# Patient Record
Sex: MALE | Race: WHITE | NOT HISPANIC OR LATINO | Employment: OTHER | URBAN - METROPOLITAN AREA
[De-identification: names, ages, dates, MRNs, and addresses within clinical notes are randomized per-mention and may not be internally consistent; named-entity substitution may affect disease eponyms.]

---

## 2022-05-05 ENCOUNTER — OFFICE VISIT (OUTPATIENT)
Dept: GASTROENTEROLOGY | Facility: CLINIC | Age: 67
End: 2022-05-05
Payer: MEDICARE

## 2022-05-05 VITALS
SYSTOLIC BLOOD PRESSURE: 136 MMHG | DIASTOLIC BLOOD PRESSURE: 86 MMHG | HEIGHT: 68 IN | BODY MASS INDEX: 45.01 KG/M2 | HEART RATE: 88 BPM | WEIGHT: 297 LBS

## 2022-05-05 DIAGNOSIS — K58.0 IRRITABLE BOWEL SYNDROME WITH DIARRHEA: ICD-10-CM

## 2022-05-05 DIAGNOSIS — K21.9 GASTROESOPHAGEAL REFLUX DISEASE WITHOUT ESOPHAGITIS: Primary | ICD-10-CM

## 2022-05-05 PROBLEM — R10.9 ABDOMINAL PAIN: Status: ACTIVE | Noted: 2022-05-05

## 2022-05-05 PROCEDURE — 99204 OFFICE O/P NEW MOD 45 MIN: CPT | Performed by: INTERNAL MEDICINE

## 2022-05-05 RX ORDER — ALPRAZOLAM 1 MG/1
1 TABLET ORAL DAILY PRN
COMMUNITY
Start: 2022-04-28

## 2022-05-05 RX ORDER — LORATADINE 10 MG/1
10 TABLET ORAL DAILY
COMMUNITY
Start: 2022-04-28

## 2022-05-05 RX ORDER — PANTOPRAZOLE SODIUM 40 MG/1
40 TABLET, DELAYED RELEASE ORAL 2 TIMES DAILY
COMMUNITY
Start: 2022-01-03 | End: 2022-05-05 | Stop reason: SDUPTHER

## 2022-05-05 RX ORDER — GABAPENTIN 300 MG/1
300 CAPSULE ORAL 3 TIMES DAILY
COMMUNITY
Start: 2022-02-17

## 2022-05-05 RX ORDER — ONDANSETRON 8 MG/1
8 TABLET, ORALLY DISINTEGRATING ORAL
COMMUNITY
Start: 2022-04-28

## 2022-05-05 RX ORDER — ATENOLOL 100 MG/1
150 TABLET ORAL 2 TIMES DAILY
COMMUNITY
Start: 2022-04-01

## 2022-05-05 RX ORDER — ALBUTEROL SULFATE 90 UG/1
2 AEROSOL, METERED RESPIRATORY (INHALATION)
COMMUNITY
Start: 2022-03-24

## 2022-05-05 RX ORDER — FUROSEMIDE 20 MG/1
20 TABLET ORAL 2 TIMES DAILY
COMMUNITY

## 2022-05-05 RX ORDER — METOPROLOL SUCCINATE 50 MG/1
50 TABLET, EXTENDED RELEASE ORAL DAILY
COMMUNITY
Start: 2021-12-29

## 2022-05-05 RX ORDER — DOXYCYCLINE 100 MG/1
TABLET ORAL
COMMUNITY

## 2022-05-05 RX ORDER — LANSOPRAZOLE 30 MG/1
30 CAPSULE, DELAYED RELEASE ORAL DAILY
COMMUNITY
End: 2022-05-05

## 2022-05-05 RX ORDER — PHENOL 1.4 %
10 AEROSOL, SPRAY (ML) MUCOUS MEMBRANE DAILY
COMMUNITY

## 2022-05-05 RX ORDER — FAMOTIDINE 20 MG/1
TABLET, FILM COATED ORAL
COMMUNITY
Start: 2022-03-28 | End: 2022-05-05

## 2022-05-05 RX ORDER — METOPROLOL SUCCINATE 50 MG/1
50 TABLET, EXTENDED RELEASE ORAL DAILY
COMMUNITY
Start: 2022-03-28

## 2022-05-05 RX ORDER — PANTOPRAZOLE SODIUM 40 MG/1
40 TABLET, DELAYED RELEASE ORAL 2 TIMES DAILY
Qty: 60 TABLET | Refills: 11 | Status: SHIPPED | OUTPATIENT
Start: 2022-05-05

## 2022-05-05 RX ORDER — FAMOTIDINE 40 MG/1
40 TABLET, FILM COATED ORAL DAILY
COMMUNITY
Start: 2022-02-24 | End: 2022-05-05

## 2022-05-05 RX ORDER — LIDOCAINE HYDROCHLORIDE 20 MG/ML
SOLUTION OROPHARYNGEAL
COMMUNITY
Start: 2022-04-28

## 2022-05-05 RX ORDER — DICYCLOMINE HYDROCHLORIDE 10 MG/1
CAPSULE ORAL
COMMUNITY
Start: 2022-04-28

## 2022-05-05 RX ORDER — METOCLOPRAMIDE 10 MG/1
10 TABLET ORAL 2 TIMES DAILY
Qty: 60 TABLET | Refills: 3 | Status: SHIPPED | OUTPATIENT
Start: 2022-05-05 | End: 2022-07-07 | Stop reason: SDUPTHER

## 2022-05-05 RX ORDER — ATORVASTATIN CALCIUM 10 MG/1
10 TABLET, FILM COATED ORAL DAILY
COMMUNITY
Start: 2022-03-17

## 2022-05-05 RX ORDER — ONDANSETRON HYDROCHLORIDE 8 MG/1
TABLET, FILM COATED ORAL
COMMUNITY

## 2022-05-05 RX ORDER — SUCRALFATE 1 G/1
1 TABLET ORAL 4 TIMES DAILY
COMMUNITY
Start: 2021-12-29

## 2022-05-05 NOTE — PROGRESS NOTES
Consultation - 126 [facility]  Ru Shelby 1955 75 y o  male     ASSESSMENT @ PLAN:   He is a 69-year-old male with gastroesophageal reflux disease related to his morbid obesity with BMI of 41 6  He had endoscopy at [facility] in December of 2021 which showed irregular Z-line and a small hiatal hernia  He has failed pantoprazole daily in addition to famotidine and Carafate  1 we will go up on his Protonix to 40 mg p o  b i d   If this is not a get approved he has to have a prior authorization done he has failed multiple medications in addition to addition of Pepcid and Carafate    2 will give Reglan 10 mg p o  b i d     3 I counseled him to not eat close to bedtime to lose weight and avoid food triggers    4 he will need follow-up  In addition he needs to have discussion regarding his IBS D at follow-up    Chief Complaint:  GERD and IBS D    HPI:  He is a 69-year-old male with gastroesophageal reflux disease which is not responding to standard medications  He has had 2 years of severe reflux  He does have morbid obesity with central adiposity with BMI of 45 1  He just had an endoscopy and I did review this in the system in Care everywhere  It was done at [facility] in December 2021  He had a small hiatal hernia with irregular Z-line  Biopsies were negative for Cottrell's esophagus  The patient has a lap band which is deflated  He reports heartburn  He has  heartburn regurgitation mostly at night  He states sleeps upright in recliner  He has no solid or liquid food dysphagia no globus  He does have abdominal pains at times  He does have known IBS  He has diarrhea and constipation with regular stools  He has no melena hematochezia  He reports a colonoscopy in 2018 and another institution  He reports gas and bloating and flatulence  The patient has been on pantoprazole 40 mg daily for years  He was then added Pepcid at bedtime  He was just added Carafate q i d    He has never been on a prokinetic never had a gastric emptying study  He has never had his Protonix at b i d  He reports that he does not eat close to bedtime  REVIEW OF SYSTEMS:     CONSTITUTIONAL: Denies any fever, chills, or rigors  Good appetite, and no recent weight loss  HEENT: No earache or tinnitus  Denies hearing loss or visual disturbances  CARDIOVASCULAR: No chest pain or palpitations  RESPIRATORY: Denies any cough, hemoptysis, shortness of breath or dyspnea on exertion  GASTROINTESTINAL: As noted in the History of Present Illness  GENITOURINARY: No problems with urination  Denies any hematuria or dysuria  NEUROLOGIC: No dizziness or vertigo, denies headaches  MUSCULOSKELETAL: Denies any muscle or joint pain  SKIN: Denies skin rashes or itching  ENDOCRINE: Denies excessive thirst  Denies intolerance to heat or cold  PSYCHOSOCIAL: Denies depression or anxiety  Denies any recent memory loss       Past Medical History:   Diagnosis Date    Atrial fibrillation (HCC)     GERD (gastroesophageal reflux disease)     Hyperlipidemia     Hypertension     Irritable bowel syndrome     Obesity     Vocal cord dysfunction       Past Surgical History:   Procedure Laterality Date    ANKLE ARTHROPLASTY      CARDIAC ELECTROPHYSIOLOGY MAPPING AND ABLATION       Social History     Socioeconomic History    Marital status: /Civil Union     Spouse name: Not on file    Number of children: Not on file    Years of education: Not on file    Highest education level: Not on file   Occupational History    Not on file   Tobacco Use    Smoking status: Former Smoker     Quit date:      Years since quittin 3    Smokeless tobacco: Never Used   Substance and Sexual Activity    Alcohol use: Yes     Comment: occasionally    Drug use: Yes     Types: Marijuana    Sexual activity: Not on file   Other Topics Concern    Not on file   Social History Narrative    Not on file     Social Determinants of Health     Financial Resource Strain: Not on file   Food Insecurity: Not on file   Transportation Needs: Not on file   Physical Activity: Not on file   Stress: Not on file   Social Connections: Not on file   Intimate Partner Violence: Not on file   Housing Stability: Not on file     Family History   Problem Relation Age of Onset    Cancer Mother     Cancer Sister      Cat hair extract, Dust mite extract, and Pollen extract  Current Outpatient Medications   Medication Sig Dispense Refill    albuterol (PROVENTIL HFA,VENTOLIN HFA) 90 mcg/act inhaler Inhale 2 puffs      ALPRAZolam (XANAX) 1 mg tablet Take 1 mg by mouth daily as needed      Apoaequorin 10 MG CAPS Take by mouth      atorvastatin (LIPITOR) 10 mg tablet Take 10 mg by mouth daily      dicyclomine (BENTYL) 10 mg capsule TAKE 1 CAPSULE BY MOUTH 4 TIMES DAILY WITH MEALS AND AT BEDTIME      doxycycline (ADOXA) 100 MG tablet Take by mouth      furosemide (LASIX) 20 mg tablet Take 20 mg by mouth 2 (two) times a day      gabapentin (NEURONTIN) 300 mg capsule Take 300 mg by mouth 3 (three) times a day      Lidocaine Viscous HCl (XYLOCAINE) 2 % mucosal solution       loratadine (CLARITIN) 10 mg tablet Take 10 mg by mouth daily      Melatonin 10 MG TABS Take 10 mg by mouth daily      metoprolol succinate (TOPROL-XL) 50 mg 24 hr tablet Take 50 mg by mouth daily      metoprolol succinate (TOPROL-XL) 50 mg 24 hr tablet Take 50 mg by mouth daily      ondansetron (ZOFRAN) 8 mg tablet Take by mouth      ondansetron (ZOFRAN-ODT) 8 mg disintegrating tablet Take 8 mg by mouth      pantoprazole (PROTONIX) 40 mg tablet Take 1 tablet (40 mg total) by mouth 2 (two) times a day 60 tablet 11    Pradaxa 150 MG capsu Take 150 mg by mouth 2 (two) times a day      sertraline (ZOLOFT) 50 mg tablet Take 50 mg by mouth daily      Spacer/Aero-Holding [place] Use with inhaler        sucralfate (CARAFATE) 1 g tablet Take 1 g by mouth 4 (four) times a day      metoclopramide (Reglan) 10 mg tablet Take 1 tablet (10 mg total) by mouth 2 (two) times a day 60 tablet 3     No current facility-administered medications for this visit  Blood pressure 136/86, pulse 88, height 5' 8" (1 727 m), weight 135 kg (297 lb)  PHYSICAL EXAM:     General Appearance:   Alert, cooperative, no distress, appears stated age    HEENT:   Normocephalic, atraumatic, anicteric      Neck:  Supple, symmetrical, trachea midline, no adenopathy;    thyroid: no enlargement/tenderness/nodules; no carotid  bruit or JVD    Lungs:   Clear to auscultation bilaterally; no rales, rhonchi or wheezing; respirations unlabored    Heart[de-identified]   S1 and S2 normal; regular rate and rhythm; no murmur, rub, or gallop     Abdomen:   Soft, non-tender, non-distended; normal bowel sounds; no masses, no organomegaly    Genitalia:   Deferred    Rectal:   Deferred    Extremities:  No cyanosis, clubbing or edema    Pulses:  2+ and symmetric all extremities    Skin:  Skin color, texture, turgor normal, no rashes or lesions    Lymph nodes:  No palpable cervical, axillary or inguinal lymphadenopathy        No results found for: WBC, HGB, HCT, MCV, PLT  No results found for: GLUCOSE, CALCIUM, NA, K, CO2, CL, BUN, CREATININE  No results found for: ALT, AST, GGT, ALKPHOS, BILITOT  No results found for: INR, PROTIME

## 2022-07-07 ENCOUNTER — OFFICE VISIT (OUTPATIENT)
Dept: GASTROENTEROLOGY | Facility: CLINIC | Age: 67
End: 2022-07-07
Payer: MEDICARE

## 2022-07-07 VITALS
OXYGEN SATURATION: 98 % | HEIGHT: 68 IN | DIASTOLIC BLOOD PRESSURE: 78 MMHG | BODY MASS INDEX: 44.28 KG/M2 | HEART RATE: 55 BPM | WEIGHT: 292.2 LBS | SYSTOLIC BLOOD PRESSURE: 120 MMHG

## 2022-07-07 DIAGNOSIS — K58.2 IRRITABLE BOWEL SYNDROME WITH BOTH CONSTIPATION AND DIARRHEA: ICD-10-CM

## 2022-07-07 DIAGNOSIS — K21.00 GASTROESOPHAGEAL REFLUX DISEASE WITH ESOPHAGITIS WITHOUT HEMORRHAGE: Primary | ICD-10-CM

## 2022-07-07 PROCEDURE — 99214 OFFICE O/P EST MOD 30 MIN: CPT | Performed by: PHYSICIAN ASSISTANT

## 2022-07-07 RX ORDER — FAMOTIDINE 40 MG/1
40 TABLET, FILM COATED ORAL DAILY
COMMUNITY
Start: 2022-05-13

## 2022-07-07 RX ORDER — METOCLOPRAMIDE 10 MG/1
10 TABLET ORAL 2 TIMES DAILY
Qty: 60 TABLET | Refills: 3 | Status: SHIPPED | OUTPATIENT
Start: 2022-07-07

## 2022-07-07 NOTE — PROGRESS NOTES
[facility] - Outpatient Follow-up Note  Catarino Lao 75 y o  male MRN: [id number]  Encounter: [id number]          ASSESSMENT AND PLAN:      1  Gastroesophageal reflux disease with esophagitis without hemorrhage  - He has persistent heartburn and regurgitation with dry heaving at times which has improved mildly with increasing protonix to 40 mg BID but is still very bothersome  - Schedule upper GI to evaluate overall motility and identify where lap band is  - EGD at [facility] in 2021 showed a small hiatal hernia and an irregular zline, path neg for Cottrell's  - He has failed carafate and famotidine in the past  - Will resend Reglan 10 mg BID to see if this helps reduce his symptoms  - Anti-reflux diet, weight loss      2  Irritable bowel syndrome with both constipation and diarrhea  - Continue bentyl PRN  - Start a sugar free fiber powder supplementation daily     ______________________________________________________________________    SUBJECTIVE:  Jo Whitmore is a 76 yo M presenting for follow up of continued heartburn and heat in his chest as well as flare ups of his IBS  At his last visit, his protonix was increased to BID dosing  He does report some reduction in the severity of the "heat" he feels but it is still present and he needs to sleep upright in a recliner  He is very frustrated by his symptoms  He has a lap band that is deflated but he is concerned this could be causing problems  He doesn't recall getting reglan which was prescribed at his last visit  In terms of his bowel habits, he alternates between diarrhea and constipation and has the lower abdominal cramping  Bentyl helps at times  He is not taking any medications to regulate his bowel habits currently  He has tried miralax in the past but can't recall if this helped  REVIEW OF SYSTEMS IS OTHERWISE NEGATIVE        Historical Information   Past Medical History:   Diagnosis Date    Atrial fibrillation (HCC)     GERD (gastroesophageal reflux disease)     Hyperlipidemia     Hypertension     Irritable bowel syndrome     Obesity     Vocal cord dysfunction      Past Surgical History:   Procedure Laterality Date    ANKLE ARTHROPLASTY      CARDIAC ELECTROPHYSIOLOGY MAPPING AND ABLATION       Social History   Social History     Substance and Sexual Activity   Alcohol Use Yes    Comment: occasionally     Social History     Substance and Sexual Activity   Drug Use Yes    Types: Marijuana     Social History     Tobacco Use   Smoking Status Former Smoker    Quit date:     Years since quittin 5   Smokeless Tobacco Never Used     Family History   Problem Relation Age of Onset    Cancer Mother     Cancer Sister        Meds/Allergies       Current Outpatient Medications:     albuterol (PROVENTIL HFA,VENTOLIN HFA) 90 mcg/act inhaler    ALPRAZolam (XANAX) 1 mg tablet    Apoaequorin 10 MG CAPS    atorvastatin (LIPITOR) 10 mg tablet    dicyclomine (BENTYL) 10 mg capsule    doxycycline (ADOXA) 100 MG tablet    famotidine (PEPCID) 40 MG tablet    furosemide (LASIX) 20 mg tablet    gabapentin (NEURONTIN) 300 mg capsule    Lidocaine Viscous HCl (XYLOCAINE) 2 % mucosal solution    loratadine (CLARITIN) 10 mg tablet    Melatonin 10 MG TABS    metoclopramide (Reglan) 10 mg tablet    metoprolol succinate (TOPROL-XL) 50 mg 24 hr tablet    metoprolol succinate (TOPROL-XL) 50 mg 24 hr tablet    ondansetron (ZOFRAN) 8 mg tablet    ondansetron (ZOFRAN-ODT) 8 mg disintegrating tablet    pantoprazole (PROTONIX) 40 mg tablet    Pradaxa 150 MG capsu    sertraline (ZOLOFT) 50 mg tablet    Spacer/Aero-Holding Chambers [place]UN[place]    sucralfate (CARAFATE) 1 g tablet    Allergies   Allergen Reactions    Cat Hair Extract Sneezing    Dust Mite Extract Sneezing    Pollen Extract Sneezing     Seasonal allergies           Objective     Blood pressure 120/78, pulse 55, height 5' 8" (1 727 m), weight 133 kg (292 lb 3 2 oz), SpO2 98 %  Body mass index is 44 43 kg/m²  PHYSICAL EXAM:      General Appearance:   Alert, cooperative, no distress   HEENT:   Normocephalic, atraumatic, anicteric      Neck:  Supple, symmetrical, trachea midline   Lungs:   Clear to auscultation bilaterally; no rales, rhonchi or wheezing; respirations unlabored    Heart[de-identified]   Regular rate and rhythm; no murmur, rub, or gallop  Abdomen:   Soft, non-tender, non-distended; normal bowel sounds; no masses, no organomegaly    Genitalia:   Deferred    Rectal:   Deferred    Extremities:  No cyanosis, clubbing or edema    Pulses:  2+ and symmetric    Skin:  No jaundice, rashes, or lesions    Lymph nodes:  No palpable cervical lymphadenopathy        Lab Results:   No visits with results within 1 Day(s) from this visit  Latest known visit with results is:   No results found for any previous visit  Radiology Results:   No results found

## 2022-08-19 ENCOUNTER — HOSPITAL ENCOUNTER (OUTPATIENT)
Dept: RADIOLOGY | Facility: HOSPITAL | Age: 67
Discharge: HOME | End: 2022-08-19
Payer: MEDICARE

## 2022-08-19 DIAGNOSIS — K21.00 GASTROESOPHAGEAL REFLUX DISEASE WITH ESOPHAGITIS WITHOUT HEMORRHAGE: ICD-10-CM

## 2022-08-19 PROCEDURE — 74240 X-RAY XM UPR GI TRC 1CNTRST: CPT

## 2022-11-23 DIAGNOSIS — K21.00 GASTROESOPHAGEAL REFLUX DISEASE WITH ESOPHAGITIS WITHOUT HEMORRHAGE: ICD-10-CM

## 2022-11-23 RX ORDER — METOCLOPRAMIDE 10 MG/1
TABLET ORAL
Qty: 60 TABLET | Refills: 0 | Status: SHIPPED | OUTPATIENT
Start: 2022-11-23

## 2022-12-23 DIAGNOSIS — K21.00 GASTROESOPHAGEAL REFLUX DISEASE WITH ESOPHAGITIS WITHOUT HEMORRHAGE: ICD-10-CM

## 2022-12-23 RX ORDER — METOCLOPRAMIDE 10 MG/1
10 TABLET ORAL 2 TIMES DAILY
Qty: 60 TABLET | Refills: 0 | Status: SHIPPED | OUTPATIENT
Start: 2022-12-23

## 2023-01-26 DIAGNOSIS — K21.00 GASTROESOPHAGEAL REFLUX DISEASE WITH ESOPHAGITIS WITHOUT HEMORRHAGE: ICD-10-CM

## 2023-01-26 RX ORDER — METOCLOPRAMIDE 10 MG/1
10 TABLET ORAL 2 TIMES DAILY
Qty: 180 TABLET | Refills: 2 | Status: SHIPPED | OUTPATIENT
Start: 2023-01-26 | End: 2023-04-26

## 2023-01-26 NOTE — TELEPHONE ENCOUNTER
Edmundo pt-  RX: Metoclopramide 10 mg 90 qty/ refills  Uses: [facility] [id number] 16-Jun-2021 15:59 16-Jun-2021 16:05

## 2024-02-08 ENCOUNTER — OFFICE VISIT (OUTPATIENT)
Dept: FAMILY MEDICINE CLINIC | Facility: CLINIC | Age: 69
End: 2024-02-08
Payer: MEDICARE

## 2024-02-08 ENCOUNTER — APPOINTMENT (OUTPATIENT)
Dept: RADIOLOGY | Facility: CLINIC | Age: 69
End: 2024-02-08
Payer: MEDICARE

## 2024-02-08 ENCOUNTER — APPOINTMENT (OUTPATIENT)
Dept: LAB | Facility: CLINIC | Age: 69
End: 2024-02-08
Payer: MEDICARE

## 2024-02-08 VITALS
DIASTOLIC BLOOD PRESSURE: 86 MMHG | HEART RATE: 61 BPM | HEIGHT: 68 IN | OXYGEN SATURATION: 98 % | SYSTOLIC BLOOD PRESSURE: 128 MMHG | WEIGHT: 270.4 LBS | TEMPERATURE: 98.4 F | BODY MASS INDEX: 40.98 KG/M2

## 2024-02-08 DIAGNOSIS — Z12.5 SCREENING PSA (PROSTATE SPECIFIC ANTIGEN): ICD-10-CM

## 2024-02-08 DIAGNOSIS — K22.70 BARRETT'S ESOPHAGUS WITHOUT DYSPLASIA: ICD-10-CM

## 2024-02-08 DIAGNOSIS — R26.89 BALANCE DISORDER: ICD-10-CM

## 2024-02-08 DIAGNOSIS — R05.1 ACUTE COUGH: ICD-10-CM

## 2024-02-08 DIAGNOSIS — I48.0 PAF (PAROXYSMAL ATRIAL FIBRILLATION) (HCC): Primary | ICD-10-CM

## 2024-02-08 DIAGNOSIS — I42.0 DILATED IDIOPATHIC CARDIOMYOPATHY (HCC): ICD-10-CM

## 2024-02-08 DIAGNOSIS — E78.2 MIXED HYPERLIPIDEMIA: ICD-10-CM

## 2024-02-08 DIAGNOSIS — Z00.00 MEDICARE ANNUAL WELLNESS VISIT, INITIAL: ICD-10-CM

## 2024-02-08 DIAGNOSIS — K21.9 GASTROESOPHAGEAL REFLUX DISEASE WITHOUT ESOPHAGITIS: ICD-10-CM

## 2024-02-08 DIAGNOSIS — Z11.59 NEED FOR HEPATITIS C SCREENING TEST: ICD-10-CM

## 2024-02-08 DIAGNOSIS — F51.04 PSYCHOPHYSIOLOGICAL INSOMNIA: ICD-10-CM

## 2024-02-08 DIAGNOSIS — Z76.89 ENCOUNTER TO ESTABLISH CARE: ICD-10-CM

## 2024-02-08 DIAGNOSIS — R06.2 WHEEZING: ICD-10-CM

## 2024-02-08 DIAGNOSIS — G62.9 PERIPHERAL POLYNEUROPATHY: ICD-10-CM

## 2024-02-08 DIAGNOSIS — F12.90 MARIJUANA USE: ICD-10-CM

## 2024-02-08 DIAGNOSIS — I48.0 PAF (PAROXYSMAL ATRIAL FIBRILLATION) (HCC): ICD-10-CM

## 2024-02-08 DIAGNOSIS — R73.9 HYPERGLYCEMIA: ICD-10-CM

## 2024-02-08 PROBLEM — Z98.890 S/P ABLATION OF ATRIAL FIBRILLATION: Status: ACTIVE | Noted: 2019-08-05

## 2024-02-08 PROBLEM — F32.A MILD DEPRESSION: Status: ACTIVE | Noted: 2022-04-28

## 2024-02-08 PROBLEM — E78.5 HYPERLIPIDEMIA: Status: ACTIVE | Noted: 2017-05-30

## 2024-02-08 PROBLEM — R10.9 ABDOMINAL PAIN: Status: RESOLVED | Noted: 2022-05-05 | Resolved: 2024-02-08

## 2024-02-08 PROBLEM — Z87.19 HISTORY OF DIVERTICULITIS: Status: ACTIVE | Noted: 2019-10-02

## 2024-02-08 PROBLEM — Z86.79 S/P ABLATION OF ATRIAL FIBRILLATION: Status: ACTIVE | Noted: 2019-08-05

## 2024-02-08 LAB
BASOPHILS # BLD AUTO: 0.08 THOUSANDS/ÂΜL (ref 0–0.1)
BASOPHILS NFR BLD AUTO: 1 % (ref 0–1)
EOSINOPHIL # BLD AUTO: 0.38 THOUSAND/ÂΜL (ref 0–0.61)
EOSINOPHIL NFR BLD AUTO: 5 % (ref 0–6)
ERYTHROCYTE [DISTWIDTH] IN BLOOD BY AUTOMATED COUNT: 12.7 % (ref 11.6–15.1)
HCT VFR BLD AUTO: 41.3 % (ref 36.5–49.3)
HCV AB SER QL: NORMAL
HGB BLD-MCNC: 13.9 G/DL (ref 12–17)
IMM GRANULOCYTES # BLD AUTO: 0.04 THOUSAND/UL (ref 0–0.2)
IMM GRANULOCYTES NFR BLD AUTO: 1 % (ref 0–2)
LYMPHOCYTES # BLD AUTO: 2.46 THOUSANDS/ÂΜL (ref 0.6–4.47)
LYMPHOCYTES NFR BLD AUTO: 32 % (ref 14–44)
MCH RBC QN AUTO: 30.2 PG (ref 26.8–34.3)
MCHC RBC AUTO-ENTMCNC: 33.7 G/DL (ref 31.4–37.4)
MCV RBC AUTO: 90 FL (ref 82–98)
MONOCYTES # BLD AUTO: 0.48 THOUSAND/ÂΜL (ref 0.17–1.22)
MONOCYTES NFR BLD AUTO: 6 % (ref 4–12)
NEUTROPHILS # BLD AUTO: 4.24 THOUSANDS/ÂΜL (ref 1.85–7.62)
NEUTS SEG NFR BLD AUTO: 55 % (ref 43–75)
NRBC BLD AUTO-RTO: 0 /100 WBCS
PLATELET # BLD AUTO: 297 THOUSANDS/UL (ref 149–390)
PMV BLD AUTO: 9.3 FL (ref 8.9–12.7)
PSA SERPL-MCNC: 0.23 NG/ML (ref 0–4)
RBC # BLD AUTO: 4.6 MILLION/UL (ref 3.88–5.62)
WBC # BLD AUTO: 7.68 THOUSAND/UL (ref 4.31–10.16)

## 2024-02-08 PROCEDURE — G0103 PSA SCREENING: HCPCS

## 2024-02-08 PROCEDURE — G0438 PPPS, INITIAL VISIT: HCPCS | Performed by: FAMILY MEDICINE

## 2024-02-08 PROCEDURE — 85025 COMPLETE CBC W/AUTO DIFF WBC: CPT

## 2024-02-08 PROCEDURE — 86803 HEPATITIS C AB TEST: CPT

## 2024-02-08 PROCEDURE — 71046 X-RAY EXAM CHEST 2 VIEWS: CPT

## 2024-02-08 PROCEDURE — 36415 COLL VENOUS BLD VENIPUNCTURE: CPT

## 2024-02-08 PROCEDURE — 80053 COMPREHEN METABOLIC PANEL: CPT

## 2024-02-08 PROCEDURE — 80061 LIPID PANEL: CPT

## 2024-02-08 PROCEDURE — 83036 HEMOGLOBIN GLYCOSYLATED A1C: CPT

## 2024-02-08 PROCEDURE — 99205 OFFICE O/P NEW HI 60 MIN: CPT | Performed by: FAMILY MEDICINE

## 2024-02-08 RX ORDER — PREDNISONE 20 MG/1
20 TABLET ORAL 2 TIMES DAILY WITH MEALS
Qty: 10 TABLET | Refills: 0 | Status: SHIPPED | OUTPATIENT
Start: 2024-02-08

## 2024-02-08 RX ORDER — CETIRIZINE HYDROCHLORIDE 10 MG/1
10 TABLET ORAL DAILY
COMMUNITY

## 2024-02-08 RX ORDER — METOCLOPRAMIDE 10 MG/1
10 TABLET ORAL 4 TIMES DAILY
COMMUNITY

## 2024-02-08 RX ORDER — AMOXICILLIN AND CLAVULANATE POTASSIUM 875; 125 MG/1; MG/1
1 TABLET, FILM COATED ORAL EVERY 12 HOURS SCHEDULED
Qty: 14 TABLET | Refills: 0 | Status: SHIPPED | OUTPATIENT
Start: 2024-02-08 | End: 2024-02-15

## 2024-02-08 NOTE — PROGRESS NOTES
Assessment and Plan:     Problem List Items Addressed This Visit        Digestive    Gastroesophageal reflux disease without esophagitis     On Pantoprazole and will refer to GI         Relevant Medications    metoclopramide (REGLAN) 10 mg tablet    Other Relevant Orders    Ambulatory Referral to Gastroenterology    Cottrell's esophagus     On Pantoprazole   Will refer to Gastro         Relevant Medications    metoclopramide (REGLAN) 10 mg tablet    Other Relevant Orders    Ambulatory Referral to Gastroenterology       Cardiovascular and Mediastinum    Dilated idiopathic cardiomyopathy (HCC) (Chronic)     Will refer to  Cardiology   On Metoprolol          Relevant Orders    Lipid Panel with Direct LDL reflex    Comprehensive metabolic panel    CBC and differential    PAF (paroxysmal atrial fibrillation) (HCC) - Primary     S/p ablation years ago  Rate controlled on Metoprolol         Relevant Orders    Ambulatory Referral to Cardiology    Lipid Panel with Direct LDL reflex    Comprehensive metabolic panel    CBC and differential       Nervous and Auditory    Peripheral neuropathy     On Gabapentin             Other    Body mass index (BMI) of 40.0 to 44.9 in adult ([facility])     BMI Counseling: Body mass index is 41.11 kg/m². The BMI is above normal. Exercise recommendations include exercising 3-5 times per week.           Psychophysiological insomnia     Patient recently ran out of Xanax. No withdrawal symptoms.   He is using marijuana to help him sleep. Discussed risks/benefits/alternatives. Advised to discontinue marijuana.  Will have him stay off Xanax.     Consider Remeron, Hydroxyzine, Trazodone. Consider referral for CBT.  Will need follow-up to discuss further          Hyperlipidemia     Update labs, patient is on statin         Relevant Orders    Lipid Panel with Direct LDL reflex    Comprehensive metabolic panel    Balance disorder     Referral to PT          Relevant Orders    Ambulatory Referral to Physical  Therapy    Wheezing     Will check CXR and start Prednisone         Relevant Medications    predniSONE 20 mg tablet    Acute cough     Will check CXR and start abx, steroid         Relevant Medications    amoxicillin-clavulanate (AUGMENTIN) 875-125 mg per tablet    predniSONE 20 mg tablet    Other Relevant Orders    XR chest pa & lateral    Marijuana use     Advised to discontinue marijuana use        Other Visit Diagnoses     Medicare annual wellness visit, initial        Encounter to establish care        Screening PSA (prostate specific antigen)        Relevant Orders    PSA, Total Screen    Hyperglycemia        Relevant Orders    Hemoglobin A1C    Need for hepatitis C screening test        Relevant Orders    Hepatitis C antibody           Preventive health issues were discussed with patient, and age appropriate screening tests were ordered as noted in patient's After Visit Summary.  Personalized health advice and appropriate referrals for health education or preventive services given if needed, as noted in patient's After Visit Summary.     History of Present Illness:     Patient presents for a Medicare Wellness Visit    69 year old here to establish care. Multiple medical problems which we reviewed in detail.     A Fib - needs [facility] cardiologist. Is on Metoprolol, Pradaxa. Had ablation x 2    Insomnia - chronic - has been on Xanax 1 mg QHS and also smoking marijuana nightly.  Recently flared up due to loss of his wife in December.  Ran out of Xanax 2 nights ago and is sleeping ok without it. He also has been drinking 1 beer per night.    Anxiety- is on Sertraline 50 mg Q AM and Xanax at night.    Allergies- has constant PND and runny nose. He is on zyrtec daily. Will not use nasal sprays.      GERD- is on PPI- unsure when last EGD was done. Chart lists that he has Cottrell's esophagus -he states he inhaled steam and this burned his esophagus    He had the flu 3 weeks ago, improved, but still with lingering  cough. He has pressure in the sinuses frontal. Hoarseness.  Runny nose, runny eyes.   Former smoker.     He is feeling off balance, has trouble turning suddenly, loses balance easily. Does have neuropathy and is on gabapentin.        Patient Care Team:  Sean Shields MD as PCP - General (Family Medicine)     Review of Systems:     Review of Systems   Constitutional:  Negative for chills and fever.   HENT:  Positive for congestion, postnasal drip, sinus pressure and sinus pain.    Respiratory:  Positive for cough. Negative for shortness of breath.    Gastrointestinal:         GERD   Endocrine: Negative.    Genitourinary: Negative.    Musculoskeletal:  Positive for gait problem.   Allergic/Immunologic: Positive for environmental allergies.   Neurological:  Positive for dizziness and numbness.        Problem List:     Patient Active Problem List   Diagnosis   • Gastroesophageal reflux disease without esophagitis   • Body mass index (BMI) of 40.0 to 44.9 in adult ([facility])   • Irritable bowel syndrome with diarrhea   • PAF (paroxysmal atrial fibrillation) ([facility])   • S/P ablation of atrial fibrillation   • Psychophysiological insomnia   • Peripheral neuropathy   • Anxiety disorder, unspecified   • Dilated idiopathic cardiomyopathy ([facility])   • Cottrell's esophagus   • Hyperlipidemia   • Mild depression   • History of diverticulitis   • Balance disorder   • Wheezing   • Acute cough   • Marijuana use      Past Medical and Surgical History:     Past Medical History:   Diagnosis Date   • Atrial fibrillation ([facility])    • GERD (gastroesophageal reflux disease)    • Hyperlipidemia    • Hypertension    • Irritable bowel syndrome    • Obesity    • Vocal cord dysfunction      Past Surgical History:   Procedure Laterality Date   • ANKLE ARTHROPLASTY     • CARDIAC ELECTROPHYSIOLOGY MAPPING AND ABLATION     • CARPAL TUNNEL RELEASE     • CATARACT EXTRACTION Bilateral    • LAPAROSCOPIC GASTRIC BANDING     • REPLACEMENT TOTAL KNEE Left        Family History:     Family History   Problem Relation Age of Onset   • Lung cancer Mother    • Breast cancer additional onset Mother    • Cancer Mother    • Coronary artery disease Father    • Cancer Sister    • Lung cancer Sister       Social History:     Social History     Socioeconomic History   • Marital status: /Civil Union     Spouse name: None   • Number of children: None   • Years of education: None   • Highest education level: None   Occupational History   • None   Tobacco Use   • Smoking status: Former     Current packs/day: 0.00     Types: Cigarettes     Quit date:      Years since quittin.1   • Smokeless tobacco: Never   Vaping Use   • Vaping status: Never Used   Substance and Sexual Activity   • Alcohol use: Yes     Alcohol/week: 3.0 standard drinks of alcohol     Types: 3 Cans of beer per week   • Drug use: Yes     Types: Marijuana   • Sexual activity: None   Other Topics Concern   • None   Social History Narrative   • None     Social Determinants of Health     Financial Resource Strain: Low Risk  (2024)    Overall Financial Resource Strain (CARDIA)    • Difficulty of Paying Living Expenses: Not very hard   Food Insecurity: No Food Insecurity (2020)    Received from [facility]    Hunger Vital Sign    • Worried About Running Out of Food in the Last Year: Never true    • Ran Out of Food in the Last Year: Never true   Transportation Needs: No Transportation Needs (2024)    PRAPARE - Transportation    • Lack of Transportation (Medical): No    • Lack of Transportation (Non-Medical): No   Physical Activity: Not on file   Stress: Not on file   Social Connections: Not on file   Intimate Partner Violence: Not on file   Housing Stability: Not on file      Medications and Allergies:     Current Outpatient Medications   Medication Sig Dispense Refill   • albuterol (PROVENTIL HFA,VENTOLIN HFA) 90 mcg/act inhaler Inhale 2 puffs     • amoxicillin-clavulanate (AUGMENTIN) 875-125 mg per  tablet Take 1 tablet by mouth every 12 (twelve) hours for 7 days 14 tablet 0   • atorvastatin (LIPITOR) 10 mg tablet Take 10 mg by mouth daily     • cetirizine (ZyrTEC) 10 mg tablet Take 10 mg by mouth daily     • dicyclomine (BENTYL) 10 mg capsule TAKE 1 CAPSULE BY MOUTH 4 TIMES DAILY WITH MEALS AND AT BEDTIME     • gabapentin (NEURONTIN) 300 mg capsule Take 300 mg by mouth 3 (three) times a day     • metoclopramide (REGLAN) 10 mg tablet Take 10 mg by mouth 4 (four) times a day     • metoprolol succinate (TOPROL-XL) 50 mg 24 hr tablet Take 50 mg by mouth daily     • pantoprazole (PROTONIX) 40 mg tablet Take 1 tablet (40 mg total) by mouth 2 (two) times a day 60 tablet 11   • Pradaxa 150 MG capsu Take 150 mg by mouth 2 (two) times a day     • predniSONE 20 mg tablet Take 1 tablet (20 mg total) by mouth 2 (two) times a day with meals 10 tablet 0   • sertraline (ZOLOFT) 50 mg tablet Take 50 mg by mouth daily     • Spacer/Aero-Holding Chambers NUNO Use with inhaler.     • sucralfate (CARAFATE) 1 g tablet Take 1 g by mouth 4 (four) times a day       No current facility-administered medications for this visit.     Allergies   Allergen Reactions   • Cat Hair Extract Sneezing   • Dust Mite Extract Sneezing   • Pollen Extract Sneezing     Seasonal allergies      Immunizations:     Immunization History   Administered Date(s) Administered   • COVID-19 MODERNA VACC 0.5 ML IM 03/17/2021, 04/14/2021, 11/02/2021, 05/27/2022   • Influenza Quadrivalent Recombinant Preservative Free IM 09/30/2019   • Influenza Quadrivalent, 6-35 Months IM 10/31/2017, 09/19/2018   • Influenza, Seasonal Vaccine, Quadrivalent, Adjuvanted, .5e 09/22/2020   • Influenza, high dose seasonal 0.7 mL 10/15/2021, 11/02/2022, 10/19/2023   • Pneumococcal Conjugate 13-Valent 09/08/2020   • Pneumococcal Polysaccharide PPV23 09/30/2019      Health Maintenance:         Topic Date Due   • Hepatitis C Screening  Never done   • Colorectal Cancer Screening  Never done          Topic Date Due   • COVID-19 Vaccine (5 - 2023-24 season) 09/01/2023      Medicare Screening Tests and Risk Assessments:     Simeon is here for his Initial Wellness visit.     Health Risk Assessment:   Patient rates overall health as good. Patient feels that their physical health rating is slightly better. Patient is satisfied with their life. Eyesight was rated as slightly worse. Hearing was rated as slightly worse. Patient feels that their emotional and mental health rating is slightly worse. Patients states they are never, rarely angry. Patient states they are sometimes unusually tired/fatigued. Pain experienced in the last 7 days has been none. Patient states that he has experienced no weight loss or gain in last 6 months.     Depression Screening:   PHQ-2 Score: 2      Fall Risk Screening:   In the past year, patient has experienced: history of falling in past year    Number of falls: 2 or more  Injured during fall?: No    Feels unsteady when standing or walking?: Yes    Worried about falling?: No      Home Safety:  Patient does not have trouble with stairs inside or outside of their home. Patient has working smoke alarms and has working carbon monoxide detector. Home safety hazards include: none.     Nutrition:   Current diet is Regular.     Medications:   Patient is not currently taking any over-the-counter supplements. Patient is able to manage medications.     Activities of Daily Living (ADLs)/Instrumental Activities of Daily Living (IADLs):   Walk and transfer into and out of bed and chair?: Yes  Dress and groom yourself?: Yes    Bathe or shower yourself?: Yes    Feed yourself? Yes  Do your laundry/housekeeping?: Yes  Manage your money, pay your bills and track your expenses?: Yes  Make your own meals?: Yes    Do your own shopping?: Yes    Previous Hospitalizations:   Any hospitalizations or ED visits within the last 12 months?: No      Advance Care Planning:   Living will: Yes    Durable POA for  "healthcare: Yes    Advanced directive: Yes      PREVENTIVE SCREENINGS      Cardiovascular Screening:    General: Screening Not Indicated, History Lipid Disorder and Risks and Benefits Discussed    Due for: Lipid Panel      Diabetes Screening:     General: Screening Current and Risks and Benefits Discussed    Due for: Blood Glucose      Colorectal Cancer Screening:     General: Risks and Benefits Discussed    Due for: Colonoscopy - Low Risk      Prostate Cancer Screening:    General: Risks and Benefits Discussed    Due for: PSA      Osteoporosis Screening:    General: Screening Not Indicated      Abdominal Aortic Aneurysm (AAA) Screening:    Risk factors include: age between 65-74 yo and tobacco use        Lung Cancer Screening:     General: Screening Not Indicated and Risks and Benefits Discussed      Hepatitis C Screening:    General: Risks and Benefits Discussed    Screening, Brief Intervention, and Referral to Treatment (SBIRT)    Screening  Typical number of drinks in a day: 1  Typical number of drinks in a week: 5  Interpretation: Low risk drinking behavior.    Single Item Drug Screening:  How often have you used an illegal drug (including marijuana) or a prescription medication for non-medical reasons in the past year? never    Single Item Drug Screen Score: 0  Interpretation: Negative screen for possible drug use disorder    No results found.     Physical Exam:     /86   Pulse 61   Temp 98.4 °F (36.9 °C)   Ht 5' 8\" (1.727 m)   Wt 123 kg (270 lb 6.4 oz)   SpO2 98%   BMI 41.11 kg/m²     Physical Exam  Vitals and nursing note reviewed.   Constitutional:       General: He is not in acute distress.     Appearance: He is well-developed. He is obese. He is not diaphoretic.   HENT:      Head: Normocephalic and atraumatic.      Right Ear: Tympanic membrane, ear canal and external ear normal.      Left Ear: Tympanic membrane, ear canal and external ear normal.      Nose:      Right Sinus: Frontal sinus " tenderness present.      Left Sinus: Frontal sinus tenderness present.      Mouth/Throat:      Mouth: Mucous membranes are moist.      Pharynx: Oropharynx is clear.   Eyes:      Conjunctiva/sclera: Conjunctivae normal.   Cardiovascular:      Rate and Rhythm: Normal rate and regular rhythm.      Heart sounds: Normal heart sounds. No murmur heard.     No friction rub. No gallop.   Pulmonary:      Effort: Pulmonary effort is normal. No respiratory distress.      Breath sounds: No stridor. Wheezing and rhonchi (RLL) present. No rales.   Chest:      Chest wall: No tenderness.   Musculoskeletal:         General: No swelling.   Skin:     Findings: No rash.   Neurological:      General: No focal deficit present.      Mental Status: He is alert.   Psychiatric:         Mood and Affect: Mood normal.         Behavior: Behavior normal.         Thought Content: Thought content normal.         Judgment: Judgment normal.        I have spent a total time of 60 minutes on 02/08/24 in caring for this patient including Diagnostic results, Prognosis, Risks and benefits of tx options, Instructions for management, Patient and family education, Importance of tx compliance, Risk factor reductions, Impressions, Counseling / Coordination of care, Documenting in the medical record, Reviewing / ordering tests, medicine, procedures  , and Obtaining or reviewing history  .   Liliane Chapa MD

## 2024-02-08 NOTE — ASSESSMENT & PLAN NOTE
Patient recently ran out of Xanax. No withdrawal symptoms.   He is using marijuana to help him sleep. Discussed risks/benefits/alternatives. Advised to discontinue marijuana.  Will have him stay off Xanax.     Consider Remeron, Hydroxyzine, Trazodone. Consider referral for CBT.  Will need follow-up to discuss further

## 2024-02-08 NOTE — PATIENT INSTRUCTIONS
Medicare Preventive Visit Patient Instructions  Thank you for completing your Welcome to Medicare Visit or Medicare Annual Wellness Visit today. Your next wellness visit will be due in one year (2/8/2025).  The screening/preventive services that you may require over the next 5-10 years are detailed below. Some tests may not apply to you based off risk factors and/or age. Screening tests ordered at today's visit but not completed yet may show as past due. Also, please note that scanned in results may not display below.  Preventive Screenings:  Service Recommendations Previous Testing/Comments   Colorectal Cancer Screening  Colonoscopy    Fecal Occult Blood Test (FOBT)/Fecal Immunochemical Test (FIT)  Fecal DNA/Cologuard Test  Flexible Sigmoidoscopy Age: 45-75 years old   Colonoscopy: every 10 years (May be performed more frequently if at higher risk)  OR  FOBT/FIT: every 1 year  OR  Cologuard: every 3 years  OR  Sigmoidoscopy: every 5 years  Screening may be recommended earlier than age 45 if at higher risk for colorectal cancer. Also, an individualized decision between you and your healthcare provider will decide whether screening between the ages of 76-85 would be appropriate. Colonoscopy: 10/17/2017  FOBT/FIT: Not on file  Cologuard: Not on file  Sigmoidoscopy: 10/17/2017          Prostate Cancer Screening Individualized decision between patient and health care provider in men between ages of 55-69   Medicare will cover every 12 months beginning on the day after your 50th birthday PSA: No results in last 5 years           Hepatitis C Screening Once for adults born between 1945 and 1965  More frequently in patients at high risk for Hepatitis C Hep C Antibody: Not on file        Diabetes Screening 1-2 times per year if you're at risk for diabetes or have pre-diabetes Fasting glucose: No results in last 5 years (No results in last 5 years)  A1C: 5.2 % (7/3/2023)  Screening Current   Cholesterol Screening Once every 5  years if you don't have a lipid disorder. May order more often based on risk factors. Lipid panel: Not on file         Other Preventive Screenings Covered by Medicare:  Abdominal Aortic Aneurysm (AAA) Screening: covered once if your at risk. You're considered to be at risk if you have a family history of AAA or a male between the age of 65-75 who smoking at least 100 cigarettes in your lifetime.  Lung Cancer Screening: covers low dose CT scan once per year if you meet all of the following conditions: (1) Age 55-77; (2) No signs or symptoms of lung cancer; (3) Current smoker or have quit smoking within the last 15 years; (4) You have a tobacco smoking history of at least 20 pack years (packs per day x number of years you smoked); (5) You get a written order from a healthcare provider.  Glaucoma Screening: covered annually if you're considered high risk: (1) You have diabetes OR (2) Family history of glaucoma OR (3)  aged 50 and older OR (4)  American aged 65 and older  Osteoporosis Screening: covered every 2 years if you meet one of the following conditions: (1) Have a vertebral abnormality; (2) On glucocorticoid therapy for more than 3 months; (3) Have primary hyperparathyroidism; (4) On osteoporosis medications and need to assess response to drug therapy.  HIV Screening: covered annually if you're between the age of 15-65. Also covered annually if you are younger than 15 and older than 65 with risk factors for HIV infection. For pregnant patients, it is covered up to 3 times per pregnancy.    Immunizations:  Immunization Recommendations   Influenza Vaccine Annual influenza vaccination during flu season is recommended for all persons aged >= 6 months who do not have contraindications   Pneumococcal Vaccine   * Pneumococcal conjugate vaccine = PCV13 (Prevnar 13), PCV15 (Vaxneuvance), PCV20 (Prevnar 20)  * Pneumococcal polysaccharide vaccine = PPSV23 (Pneumovax) Adults 19-65 yo with certain  risk factors or if 65+ yo  If never received any pneumonia vaccine: recommend Prevnar 20 (PCV20)  Give PCV20 if previously received 1 dose of PCV13 or PPSV23   Hepatitis B Vaccine 3 dose series if at intermediate or high risk (ex: diabetes, end stage renal disease, liver disease)   Respiratory syncytial virus (RSV) Vaccine - COVERED BY MEDICARE PART D  * RSVPreF3 (Arexvy) CDC recommends that adults 60 years of age and older may receive a single dose of RSV vaccine using shared clinical decision-making (SCDM)   Tetanus (Td) Vaccine - COST NOT COVERED BY MEDICARE PART B Following completion of primary series, a booster dose should be given every 10 years to maintain immunity against tetanus. Td may also be given as tetanus wound prophylaxis.   Tdap Vaccine - COST NOT COVERED BY MEDICARE PART B Recommended at least once for all adults. For pregnant patients, recommended with each pregnancy.   Shingles Vaccine (Shingrix) - COST NOT COVERED BY MEDICARE PART B  2 shot series recommended in those 19 years and older who have or will have weakened immune systems or those 50 years and older     Health Maintenance Due:      Topic Date Due   • Hepatitis C Screening  Never done   • Colorectal Cancer Screening  Never done     Immunizations Due:      Topic Date Due   • COVID-19 Vaccine (5 - 2023-24 season) 09/01/2023     Advance Directives   What are advance directives?  Advance directives are legal documents that state your wishes and plans for medical care. These plans are made ahead of time in case you lose your ability to make decisions for yourself. Advance directives can apply to any medical decision, such as the treatments you want, and if you want to donate organs.   What are the types of advance directives?  There are many types of advance directives, and each state has rules about how to use them. You may choose a combination of any of the following:  Living will:  This is a written record of the treatment you want. You  can also choose which treatments you do not want, which to limit, and which to stop at a certain time. This includes surgery, medicine, IV fluid, and tube feedings.   Durable power of  for healthcare (DPAHC):  This is a written record that states who you want to make healthcare choices for you when you are unable to make them for yourself. This person, called a proxy, is usually a family member or a friend. You may choose more than 1 proxy.  Do not resuscitate (DNR) order:  A DNR order is used in case your heart stops beating or you stop breathing. It is a request not to have certain forms of treatment, such as CPR. A DNR order may be included in other types of advance directives.  Medical directive:  This covers the care that you want if you are in a coma, near death, or unable to make decisions for yourself. You can list the treatments you want for each condition. Treatment may include pain medicine, surgery, blood transfusions, dialysis, IV or tube feedings, and a ventilator (breathing machine).  Values history:  This document has questions about your views, beliefs, and how you feel and think about life. This information can help others choose the care that you would choose.  Why are advance directives important?  An advance directive helps you control your care. Although spoken wishes may be used, it is better to have your wishes written down. Spoken wishes can be misunderstood, or not followed. Treatments may be given even if you do not want them. An advance directive may make it easier for your family to make difficult choices about your care.   Fall Prevention    Fall prevention  includes ways to make your home and other areas safer. It also includes ways you can move more carefully to prevent a fall. Health conditions that cause changes in your blood pressure, vision, or muscle strength and coordination may increase your risk for falls. Medicines may also increase your risk for falls if they make  you dizzy, weak, or sleepy.   Fall prevention tips:   Stand or sit up slowly.    Use assistive devices as directed.    Wear shoes that fit well and have soles that .    Wear a personal alarm.    Stay active.    Manage your medical conditions.    Home Safety Tips:  Add items to prevent falls in the bathroom.    Keep paths clear.    Install bright lights in your home.    Keep items you use often on shelves within reach.    Paint or place reflective tape on the edges of your stairs.       © Copyright [employer] 2018 Information is for End User's use only and may not be sold, redistributed or otherwise used for commercial purposes. All illustrations and images included in CareNotes® are the copyrighted property of [employer] or [employer]

## 2024-02-08 NOTE — ASSESSMENT & PLAN NOTE
BMI Counseling: Body mass index is 41.11 kg/m². The BMI is above normal. Exercise recommendations include exercising 3-5 times per week.

## 2024-02-09 LAB
ALBUMIN SERPL BCP-MCNC: 4.2 G/DL (ref 3.5–5)
ALP SERPL-CCNC: 64 U/L (ref 34–104)
ALT SERPL W P-5'-P-CCNC: 12 U/L (ref 7–52)
ANION GAP SERPL CALCULATED.3IONS-SCNC: 13 MMOL/L
AST SERPL W P-5'-P-CCNC: 18 U/L (ref 13–39)
BILIRUB SERPL-MCNC: 0.44 MG/DL (ref 0.2–1)
BUN SERPL-MCNC: 10 MG/DL (ref 5–25)
CALCIUM SERPL-MCNC: 9.2 MG/DL (ref 8.4–10.2)
CHLORIDE SERPL-SCNC: 102 MMOL/L (ref 96–108)
CHOLEST SERPL-MCNC: 146 MG/DL
CO2 SERPL-SCNC: 24 MMOL/L (ref 21–32)
CREAT SERPL-MCNC: 0.81 MG/DL (ref 0.6–1.3)
GFR SERPL CREATININE-BSD FRML MDRD: 90 ML/MIN/1.73SQ M
GLUCOSE P FAST SERPL-MCNC: 71 MG/DL (ref 65–99)
HDLC SERPL-MCNC: 51 MG/DL
LDLC SERPL CALC-MCNC: 55 MG/DL (ref 0–100)
POTASSIUM SERPL-SCNC: 4 MMOL/L (ref 3.5–5.3)
PROT SERPL-MCNC: 7.4 G/DL (ref 6.4–8.4)
SODIUM SERPL-SCNC: 139 MMOL/L (ref 135–147)
TRIGL SERPL-MCNC: 199 MG/DL

## 2024-02-10 LAB
EST. AVERAGE GLUCOSE BLD GHB EST-MCNC: 111 MG/DL
HBA1C MFR BLD: 5.5 %

## 2024-02-12 ENCOUNTER — TELEPHONE (OUTPATIENT)
Dept: FAMILY MEDICINE CLINIC | Facility: CLINIC | Age: 69
End: 2024-02-12

## 2024-02-12 NOTE — TELEPHONE ENCOUNTER
Yes he can take prednisone twice a day with his other medications.  He should be almost done with it,  it was only 5 days

## 2024-02-12 NOTE — TELEPHONE ENCOUNTER
Daughter called and stated that patient has been having an issue with sweating and sleeping since on the prednisone - explained that these are normal SE of medications and he should try to avoid taking it before bed - she is asking if he can take the prednisone with his other medications? Doesn't want him to have a reaction -

## 2024-02-13 DIAGNOSIS — Z79.899 LONG-TERM CURRENT USE OF BENZODIAZEPINE: Primary | ICD-10-CM

## 2024-02-13 RX ORDER — ALPRAZOLAM 0.5 MG/1
0.5 TABLET ORAL 3 TIMES DAILY PRN
Qty: 30 TABLET | Refills: 0 | Status: SHIPPED | OUTPATIENT
Start: 2024-02-13

## 2024-02-13 NOTE — TELEPHONE ENCOUNTER
"Spoke with patient, I informed him of your message. He said that he has been taking this along with the antibiotic and his other medication. He has a pounding headache and times of disorientation. Unsure if you would like to call him directly regarding this or possibly do a virtual.    Also a voicemail from daughter:    \"Hi, this is Meaghan Maloney. I'm calling on behalf of Simeon Giron, my father, Doctor Eduard is his doctor. We were there last Thursday. We've had ongoing communications. Looks like my father is going through withdrawal from Doctor Eduard not continuing the Xanax with my father, since my dad had not been on it for two days and she thought it was a good time to get him off of it. Well, he's showing signs of withdrawal. He's not sleeping, He says His mind feels like it's scrambled. He has the sweats when he takes the Prednisone. We need someone. We need her to call me, OK? And either she needs to prescribe the Xanax on a lower dosage or something to wean him off, Or you guys need to tell me what I need to do to help him through this because he feels like he's losing his mind and we need help. So my cell number is [phone]. Please give me a call back as soon as possible. It's about 11:20 on February 13th. Thank you.    "

## 2024-02-13 NOTE — TELEPHONE ENCOUNTER
I sent in 0.5 mg Xanax which he can use Q8 hours prn, this is a lower dose, to try to wean him off slower  I can do a virtual tomorrow. Can overbook one of my 40s

## 2024-02-14 ENCOUNTER — TELEMEDICINE (OUTPATIENT)
Dept: FAMILY MEDICINE CLINIC | Facility: CLINIC | Age: 69
End: 2024-02-14
Payer: MEDICARE

## 2024-02-14 DIAGNOSIS — F13.930 BENZODIAZEPINE WITHDRAWAL WITHOUT COMPLICATION (HCC): ICD-10-CM

## 2024-02-14 DIAGNOSIS — F41.9 ANXIETY DISORDER, UNSPECIFIED TYPE: Primary | ICD-10-CM

## 2024-02-14 DIAGNOSIS — F12.90 MARIJUANA USE: ICD-10-CM

## 2024-02-14 DIAGNOSIS — F32.A MILD DEPRESSION: ICD-10-CM

## 2024-02-14 PROCEDURE — 99214 OFFICE O/P EST MOD 30 MIN: CPT | Performed by: FAMILY MEDICINE

## 2024-02-14 RX ORDER — SERTRALINE HYDROCHLORIDE 100 MG/1
100 TABLET, FILM COATED ORAL DAILY
Qty: 90 TABLET | Refills: 1 | Status: SHIPPED | OUTPATIENT
Start: 2024-02-14

## 2024-02-14 NOTE — ASSESSMENT & PLAN NOTE
Rx for Xanax 0.5 mg to use PRN  Discussed gradually will wean as tolerated - could take months given the fact that he has been on this for years.

## 2024-02-14 NOTE — PROGRESS NOTES
Virtual Regular Visit    Verification of patient location:    Patient is located at Home in the following state in which I hold an active license [place]      Assessment/Plan:    Problem List Items Addressed This Visit        Other    Anxiety disorder, unspecified - Primary     Will increase Sertraline to 100 mg  Xanax to use QHS prn - decreased dose to 0.5 mg         Relevant Medications    sertraline (ZOLOFT) 100 mg tablet    Mild depression     Increase sertraline to 100 mg         Relevant Medications    sertraline (ZOLOFT) 100 mg tablet    Marijuana use     Counseled to abstain from marijuana use         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                 Reason for visit is   Chief Complaint   Patient presents with   • Virtual Regular Visit          Encounter provider Liliane Chapa MD    Provider located at [place] [address]  [facility] [id number]      Recent Visits  Date Type Provider Dept   [facility]   02/08/24 Office Visit Liliane Chapa MD [facility]   Showing recent visits within past 7 days and meeting all other requirements  Today's Visits  Date Type Provider Dept   02/14/24 Telemedicine Liliane Chapa MD [facility]   Showing today's visits and meeting all other requirements  Future Appointments  No visits were found meeting these conditions.  Showing future appointments within next 150 days and meeting all other requirements       The patient was identified by name and date of birth. Simeon Bree was informed that this is a telemedicine visit and that the visit is being conducted through the Epic Embedded platform. He agrees to proceed..  My office door was closed. No one else was in the room.  He  acknowledged consent and understanding of privacy and security of the video platform. The patient has agreed to participate and understands they can discontinue the visit at any time.    Patient is aware this is a billable service.     Subjective  Simeon Giron is a 69 y.o. male doing virtual visit      69 year old doing a virtual visit with daughter.  He was having some withdrawal symptoms from being off Xanax - he had been on 1 mg BID for years, then was changed to Sertraline in the AM and Xanax 1 mg QHS for past 4 years. At his visit when he was establishing care on 24 he had been off the medication for 2 days and wasn't having any issues so it was discontinued. Subsequently, he had developed shakiness, anxiety, disorientation, and insomnia.  Daughter called the office and I prescribed him a lower dose of Xanax.  Last night took 0.5 mg Xanax which helped relieve his symptoms and he was able to sleep.  He is also on Sertraline 50 mg Qam. He has depression/anxiety worsening since his wife  in December.   He has used marijuana but states he will not use this any more lea in combination with Xanax. Was unaware they were not to be used concurrently. He did use marijuana last night to try to get some sleep.           Past Medical History:   Diagnosis Date   • Atrial fibrillation (HCC)    • GERD (gastroesophageal reflux disease)    • Hyperlipidemia    • Hypertension    • Irritable bowel syndrome    • Obesity    • Vocal cord dysfunction        Past Surgical History:   Procedure Laterality Date   • ANKLE ARTHROPLASTY     • CARDIAC ELECTROPHYSIOLOGY MAPPING AND ABLATION     • CARPAL TUNNEL RELEASE     • CATARACT EXTRACTION Bilateral    • LAPAROSCOPIC GASTRIC BANDING     • REPLACEMENT TOTAL KNEE Left        Current Outpatient Medications   Medication Sig Dispense Refill   • sertraline (ZOLOFT) 100 mg tablet Take 1 tablet (100 mg total) by mouth daily 90 tablet 1   • albuterol (PROVENTIL HFA,VENTOLIN HFA) 90 mcg/act  inhaler Inhale 2 puffs     • ALPRAZolam (XANAX) 0.5 mg tablet Take 1 tablet (0.5 mg total) by mouth 3 (three) times a day as needed for anxiety 30 tablet 0   • amoxicillin-clavulanate (AUGMENTIN) 875-125 mg per tablet Take 1 tablet by mouth every 12 (twelve) hours for 7 days 14 tablet 0   • atorvastatin (LIPITOR) 10 mg tablet Take 10 mg by mouth daily     • cetirizine (ZyrTEC) 10 mg tablet Take 10 mg by mouth daily     • dicyclomine (BENTYL) 10 mg capsule TAKE 1 CAPSULE BY MOUTH 4 TIMES DAILY WITH MEALS AND AT BEDTIME     • gabapentin (NEURONTIN) 300 mg capsule Take 300 mg by mouth 3 (three) times a day     • metoclopramide (REGLAN) 10 mg tablet Take 10 mg by mouth 4 (four) times a day     • metoprolol succinate (TOPROL-XL) 50 mg 24 hr tablet Take 50 mg by mouth daily     • pantoprazole (PROTONIX) 40 mg tablet Take 1 tablet (40 mg total) by mouth 2 (two) times a day 60 tablet 11   • Pradaxa 150 MG capsu Take 150 mg by mouth 2 (two) times a day     • predniSONE 20 mg tablet Take 1 tablet (20 mg total) by mouth 2 (two) times a day with meals 10 tablet 0   • Spacer/Aero-Holding Chambers NUNO Use with inhaler.     • sucralfate (CARAFATE) 1 g tablet Take 1 g by mouth 4 (four) times a day       No current facility-administered medications for this visit.        Allergies   Allergen Reactions   • Cat Hair Extract Sneezing   • Dust Mite Extract Sneezing   • Pollen Extract Sneezing     Seasonal allergies       Review of Systems   Psychiatric/Behavioral:  Positive for agitation, dysphoric mood and sleep disturbance. The patient is nervous/anxious.        Video Exam    There were no vitals filed for this visit.    Physical Exam  Vitals and nursing note reviewed.   Constitutional:       General: He is not in acute distress.     Appearance: He is well-developed. He is not diaphoretic.   HENT:      Head: Normocephalic and atraumatic.   Pulmonary:      Effort: Pulmonary effort is normal. No respiratory distress.   Musculoskeletal:       Cervical back: Normal range of motion.      Lumbar back: No swelling or deformity. Normal range of motion. Negative right straight leg raise test and negative left straight leg raise test.   Neurological:      Mental Status: He is alert.   Psychiatric:         Behavior: Behavior normal.         Thought Content: Thought content normal.         Judgment: Judgment normal.          Visit Time  Total Visit Duration: 15

## 2024-02-21 ENCOUNTER — TELEPHONE (OUTPATIENT)
Dept: ADMINISTRATIVE | Facility: OTHER | Age: 69
End: 2024-02-21

## 2024-02-21 PROBLEM — R05.1 ACUTE COUGH: Status: RESOLVED | Noted: 2024-02-08 | Resolved: 2024-02-21

## 2024-02-21 NOTE — TELEPHONE ENCOUNTER
02/21/24 11:37 AM    Patient contacted (spoke with patient) to bring Advance Directive, POLST, or Living Will document to next scheduled pcp visit.    Thank you.  Vickie Gallagher [facility]

## 2024-02-22 ENCOUNTER — OFFICE VISIT (OUTPATIENT)
Dept: FAMILY MEDICINE CLINIC | Facility: CLINIC | Age: 69
End: 2024-02-22
Payer: MEDICARE

## 2024-02-22 VITALS
DIASTOLIC BLOOD PRESSURE: 72 MMHG | OXYGEN SATURATION: 100 % | SYSTOLIC BLOOD PRESSURE: 130 MMHG | HEART RATE: 67 BPM | WEIGHT: 265 LBS | BODY MASS INDEX: 40.29 KG/M2 | TEMPERATURE: 97.8 F

## 2024-02-22 DIAGNOSIS — F41.9 ANXIETY DISORDER, UNSPECIFIED TYPE: ICD-10-CM

## 2024-02-22 DIAGNOSIS — H53.9 VISION DISTURBANCE: Primary | ICD-10-CM

## 2024-02-22 DIAGNOSIS — R51.9 NONINTRACTABLE HEADACHE, UNSPECIFIED CHRONICITY PATTERN, UNSPECIFIED HEADACHE TYPE: ICD-10-CM

## 2024-02-22 DIAGNOSIS — H53.40 UNSPECIFIED VISUAL FIELD DEFECTS: ICD-10-CM

## 2024-02-22 DIAGNOSIS — F13.930 BENZODIAZEPINE WITHDRAWAL WITHOUT COMPLICATION (HCC): ICD-10-CM

## 2024-02-22 DIAGNOSIS — R42 DIZZINESS AND GIDDINESS: ICD-10-CM

## 2024-02-22 PROBLEM — R06.2 WHEEZING: Status: RESOLVED | Noted: 2024-02-08 | Resolved: 2024-02-22

## 2024-02-22 PROCEDURE — 99214 OFFICE O/P EST MOD 30 MIN: CPT | Performed by: FAMILY MEDICINE

## 2024-02-22 NOTE — PROGRESS NOTES
Assessment/Plan:         Problem List Items Addressed This Visit        Other    Anxiety disorder, unspecified     Has not started higher dose of sertraline yet. Will increase to 100 mg and uses xanax at bedtime.         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)     Resolved  Doing well on Xanax 0.5 mg         Vision disturbance - Primary     Eye exam scheduled tomorrow  Will check MRA         Relevant Orders    MRA head w wo contrast    MRA carotids wo and w contrast    Nonintractable headache     Will check MRA brain and carotids         Relevant Orders    MRA head w wo contrast    MRA carotids wo and w contrast    Unspecified visual field defects    Relevant Orders    MRA carotids wo and w contrast   Other Visit Diagnoses     Dizziness and giddiness        Relevant Orders    MRA head w wo contrast            Subjective:      Patient ID: Simeon Giron is a 69 y.o. male.    69 year old with multiple medical problems here for a headache that occurred on 2/16/24. He says the headache was severe and since then he has had blurry vision.   He says he can't focus his vision. He has an eye doctor appt tomorrow. No slurred speech or drooping of the face. Occasional dizziness.    We had a virtual visit for benzo withdrawal from Xanax, he was started on a lower dose of Xanax and is doing well on this. Sleeping well. Withdrawal symptoms have resolved.     Headache      The following portions of the patient's history were reviewed and updated as appropriate:   Past Medical History:  He has a past medical history of Atrial fibrillation (HCC), GERD (gastroesophageal reflux disease), Hyperlipidemia, Hypertension, Irritable bowel syndrome, Obesity, and Vocal cord dysfunction.,  _______________________________________________________________________  Medical Problems:  does not have any pertinent problems on file.,  _______________________________________________________________________  Past Surgical History:   has a  past surgical history that includes Cardiac electrophysiology mapping and ablation; Ankle arthroplasty; Laparoscopic gastric banding; Replacement total knee (Left); Cataract extraction (Bilateral); and Carpal tunnel release.,  _______________________________________________________________________  Family History:  family history includes Breast cancer additional onset in his mother; Cancer in his mother and sister; Coronary artery disease in his father; Lung cancer in his mother and sister.,  _______________________________________________________________________  Social History:   reports that he quit smoking about 36 years ago. His smoking use included cigarettes. He has never used smokeless tobacco. He reports current alcohol use of about 3.0 standard drinks of alcohol per week. He reports current drug use. Drug: Marijuana.,  _______________________________________________________________________  Allergies:  is allergic to cat hair extract, dust mite extract, and pollen extract..  _______________________________________________________________________  Current Outpatient Medications   Medication Sig Dispense Refill   • albuterol (PROVENTIL HFA,VENTOLIN HFA) 90 mcg/act inhaler Inhale 2 puffs     • ALPRAZolam (XANAX) 0.5 mg tablet Take 1 tablet (0.5 mg total) by mouth 3 (three) times a day as needed for anxiety 30 tablet 0   • atorvastatin (LIPITOR) 10 mg tablet Take 10 mg by mouth daily     • cetirizine (ZyrTEC) 10 mg tablet Take 10 mg by mouth daily     • dicyclomine (BENTYL) 10 mg capsule TAKE 1 CAPSULE BY MOUTH 4 TIMES DAILY WITH MEALS AND AT BEDTIME     • gabapentin (NEURONTIN) 300 mg capsule Take 300 mg by mouth 3 (three) times a day     • metoclopramide (REGLAN) 10 mg tablet Take 10 mg by mouth 4 (four) times a day     • metoprolol succinate (TOPROL-XL) 50 mg 24 hr tablet Take 50 mg by mouth daily     • pantoprazole (PROTONIX) 40 mg tablet Take 1 tablet (40 mg total) by mouth 2 (two) times a day 60 tablet  11   • Pradaxa 150 MG capsu Take 150 mg by mouth 2 (two) times a day     • sertraline (ZOLOFT) 100 mg tablet Take 1 tablet (100 mg total) by mouth daily 90 tablet 1   • Spacer/Aero-Holding Chambers NUNO Use with inhaler.     • sucralfate (CARAFATE) 1 g tablet Take 1 g by mouth 4 (four) times a day       No current facility-administered medications for this visit.     _______________________________________________________________________  Review of Systems   Eyes:  Positive for visual disturbance.   Neurological:  Positive for dizziness and headaches. Negative for seizures, syncope, facial asymmetry, speech difficulty and numbness.   Psychiatric/Behavioral:  The patient is not nervous/anxious.          Objective:  Vitals:    02/22/24 1404   BP: 130/72   Pulse: 67   Temp: 97.8 °F (36.6 °C)   SpO2: 100%   Weight: 120 kg (265 lb)     Body mass index is 40.29 kg/m².     Physical Exam  Vitals and nursing note reviewed.   Constitutional:       General: He is not in acute distress.     Appearance: Normal appearance. He is well-developed. He is not diaphoretic.   HENT:      Head: Normocephalic and atraumatic.      Nose: Nose normal.      Mouth/Throat:      Mouth: Mucous membranes are moist.      Pharynx: Oropharynx is clear.   Eyes:      General:         Right eye: No discharge.         Left eye: No discharge.      Extraocular Movements: Extraocular movements intact.      Conjunctiva/sclera: Conjunctivae normal.      Pupils: Pupils are equal, round, and reactive to light.   Cardiovascular:      Rate and Rhythm: Normal rate and regular rhythm.      Heart sounds: Normal heart sounds. No murmur heard.     No friction rub. No gallop.   Pulmonary:      Effort: Pulmonary effort is normal. No respiratory distress.      Breath sounds: Normal breath sounds. No wheezing or rales.   Chest:      Chest wall: No tenderness.   Neurological:      General: No focal deficit present.      Mental Status: He is alert and oriented to person,  place, and time. Mental status is at baseline.      Cranial Nerves: No cranial nerve deficit.      Gait: Gait normal.   Psychiatric:         Mood and Affect: Mood normal.         Behavior: Behavior normal.         Thought Content: Thought content normal.         Judgment: Judgment normal.

## 2024-03-05 DIAGNOSIS — Z92.89 HISTORY OF TEST FOR HEARING: Primary | ICD-10-CM

## 2024-03-07 ENCOUNTER — OFFICE VISIT (OUTPATIENT)
Dept: GASTROENTEROLOGY | Facility: CLINIC | Age: 69
End: 2024-03-07
Payer: MEDICARE

## 2024-03-07 VITALS
SYSTOLIC BLOOD PRESSURE: 124 MMHG | HEART RATE: 79 BPM | WEIGHT: 270.2 LBS | HEIGHT: 68 IN | DIASTOLIC BLOOD PRESSURE: 72 MMHG | OXYGEN SATURATION: 98 % | BODY MASS INDEX: 40.95 KG/M2 | TEMPERATURE: 97.9 F

## 2024-03-07 DIAGNOSIS — K21.9 GASTROESOPHAGEAL REFLUX DISEASE WITHOUT ESOPHAGITIS: ICD-10-CM

## 2024-03-07 DIAGNOSIS — K22.70 BARRETT'S ESOPHAGUS WITHOUT DYSPLASIA: ICD-10-CM

## 2024-03-07 DIAGNOSIS — Z12.11 COLON CANCER SCREENING: ICD-10-CM

## 2024-03-07 DIAGNOSIS — R49.0 HOARSE VOICE QUALITY: Primary | ICD-10-CM

## 2024-03-07 DIAGNOSIS — K58.0 IRRITABLE BOWEL SYNDROME WITH DIARRHEA: ICD-10-CM

## 2024-03-07 PROCEDURE — 99214 OFFICE O/P EST MOD 30 MIN: CPT | Performed by: PHYSICIAN ASSISTANT

## 2024-03-07 RX ORDER — ERYTHROMYCIN 5 MG/G
OINTMENT OPHTHALMIC
COMMUNITY
Start: 2024-02-23

## 2024-03-07 RX ORDER — DICYCLOMINE HYDROCHLORIDE 10 MG/1
10 CAPSULE ORAL EVERY 6 HOURS PRN
Qty: 90 CAPSULE | Refills: 1 | Status: SHIPPED | OUTPATIENT
Start: 2024-03-07

## 2024-03-07 RX ORDER — PREDNISONE 20 MG/1
20 TABLET ORAL 4 TIMES DAILY
COMMUNITY
Start: 2024-02-23 | End: 2024-03-07

## 2024-03-07 NOTE — PATIENT INSTRUCTIONS
Scheduled date of EGD/colonoscopy (as of today):4/4/24     Pradaxa 2 Day Hold  Physician performing EGD/colonoscopy:Edmundo  Location of EGD/colonoscopy:[facility]  Desired bowel prep reviewed with patient:Tamica/Miralax  Instructions reviewed with patient by:Travis hess  Clearances:   none

## 2024-03-07 NOTE — PROGRESS NOTES
[facility] - Outpatient Follow-up Note  Simeon Giron 69 y.o. male MRN: [id number]  Encounter: [id number]          ASSESSMENT AND PLAN:      1. Cottrell's esophagus without dysplasia  2. Gastroesophageal reflux disease without esophagitis  3. Hoarse voice quality  - He has a history of chronic GERD with prior EGD in 2020 showing Cottrell's and then EGD in 2021 negative for Cottrell's, presenting for repeat EGD given hoarse voice quality  - We will repeat his EGD with biopsies  - Continue protonix 40 mg BID, reglan as needed   -Refer to ENT for hoarse voice    4. Irritable bowel syndrome with diarrhea  5. Colon cancer screening  - History of IBS-D with recent worsening after his wife's passing in December  - Likely due for colonoscopy this year as well given last colonoscopy was at least 5 years ago in [place]  - Dicyclomine PRN  - Start fiber supplementation to bulk BMs and reduce frequency  - Schedule colonoscopy with miralax prep to update colon cancer screening    ______________________________________________________________________    SUBJECTIVE:  Simeon is a 68 yo M with a PMH of Afib on Pradaxa, IBS-D, GERD, presenting to schedule a colonoscopy and possible EGD per his PCP.  He believes his last colonoscopy was about 5 years ago but done in [place].  He does not remember if he had any polyps.  He has a history of IBS with diarrhea and recently went through flareup in December after his wife passed away.  He was having a lot of abdominal cramping and was given dicyclomine which has really been helpful to him.  He has not really needed to use this recently but has it on hand.  He is having about 4-5 bowel movements a day that are small and on the loose side with some urgency.  Normally he would have 1-2 bowel movements a day so this is worse than his usual.  He denies any black or bloody bowel movements.  He also has a longstanding reflux for which she takes Protonix 40 mg twice daily  and he is on Reglan as needed.  He had 2 EGDs with Nita, the first in  showing Cottrell's esophagus and the second 1 in  being negative for Cottrell's esophagus.  Recently has been dealing with a hoarse voice quality which is why his PCP wanted him to get an upper endoscopy.  He has not seen ENT recently.  He reports that he had a work injury years ago related to significant steam exposure and it was thought that he had some kind of injury to his vocal cords from this, per the patient.      REVIEW OF SYSTEMS IS OTHERWISE NEGATIVE.      Historical Information   Past Medical History:   Diagnosis Date    Atrial fibrillation (HCC)     GERD (gastroesophageal reflux disease)     Hyperlipidemia     Hypertension     Irritable bowel syndrome     Obesity     Vocal cord dysfunction      Past Surgical History:   Procedure Laterality Date    ANKLE ARTHROPLASTY      CARDIAC ELECTROPHYSIOLOGY MAPPING AND ABLATION      CARPAL TUNNEL RELEASE      CATARACT EXTRACTION Bilateral     LAPAROSCOPIC GASTRIC BANDING      REPLACEMENT TOTAL KNEE Left      Social History   Social History     Substance and Sexual Activity   Alcohol Use Yes    Alcohol/week: 3.0 standard drinks of alcohol    Types: 3 Cans of beer per week     Social History     Substance and Sexual Activity   Drug Use Yes    Types: Marijuana     Social History     Tobacco Use   Smoking Status Former    Current packs/day: 0.00    Types: Cigarettes    Quit date:     Years since quittin.2   Smokeless Tobacco Never     Family History   Problem Relation Age of Onset    Lung cancer Mother     Breast cancer additional onset Mother     Cancer Mother     Coronary artery disease Father     Cancer Sister     Lung cancer Sister        Meds/Allergies       Current Outpatient Medications:     albuterol (PROVENTIL HFA,VENTOLIN HFA) 90 mcg/act inhaler    ALPRAZolam (XANAX) 0.5 mg tablet    atorvastatin (LIPITOR) 10 mg tablet    cetirizine (ZyrTEC) 10 mg tablet    dicyclomine  "(BENTYL) 10 mg capsule    erythromycin (ILOTYCIN) ophthalmic ointment    gabapentin (NEURONTIN) 300 mg capsule    metoclopramide (REGLAN) 10 mg tablet    metoprolol succinate (TOPROL-XL) 50 mg 24 hr tablet    pantoprazole (PROTONIX) 40 mg tablet    Pradaxa 150 MG capsu    sertraline (ZOLOFT) 100 mg tablet    Spacer/Aero-Holding Chambers NUNO    sucralfate (CARAFATE) 1 g tablet    Allergies   Allergen Reactions    Cat Hair Extract Sneezing    Dust Mite Extract Sneezing    Pollen Extract Sneezing     Seasonal allergies           Objective     Blood pressure 124/72, pulse 79, temperature 97.9 °F (36.6 °C), height 5' 8\" (1.727 m), weight 123 kg (270 lb 3.2 oz), SpO2 98%. Body mass index is 41.08 kg/m².      PHYSICAL EXAM:      General Appearance:   Alert, cooperative, no distress   HEENT:   Normocephalic, atraumatic, anicteric.     Neck:  Supple, symmetrical, trachea midline   Lungs:   Clear to auscultation bilaterally; no rales, rhonchi or wheezing; respirations unlabored    Heart::   Regular rate and rhythm; no murmur, rub, or gallop.   Abdomen:   Soft, non-tender, non-distended; normal bowel sounds; no masses, no organomegaly    Genitalia:   Deferred    Rectal:   Deferred    Extremities:  No cyanosis, clubbing or edema    Pulses:  2+ and symmetric    Skin:  No jaundice, rashes, or lesions    Lymph nodes:  No palpable cervical lymphadenopathy        Lab Results:   No visits with results within 1 Day(s) from this visit.   Latest known visit with results is:   Appointment on 02/08/2024   Component Date Value    Cholesterol 02/08/2024 146     Triglycerides 02/08/2024 199 (H)     HDL, Direct 02/08/2024 51     LDL Calculated 02/08/2024 55     Sodium 02/08/2024 139     Potassium 02/08/2024 4.0     Chloride 02/08/2024 102     CO2 02/08/2024 24     ANION GAP 02/08/2024 13     BUN 02/08/2024 10     Creatinine 02/08/2024 0.81     Glucose, Fasting 02/08/2024 71     Calcium 02/08/2024 9.2     AST 02/08/2024 18     ALT 02/08/2024 " 12     Alkaline Phosphatase 02/08/2024 64     Total Protein 02/08/2024 7.4     Albumin 02/08/2024 4.2     Total Bilirubin 02/08/2024 0.44     eGFR 02/08/2024 90     WBC 02/08/2024 7.68     RBC 02/08/2024 4.60     Hemoglobin 02/08/2024 13.9     Hematocrit 02/08/2024 41.3     MCV 02/08/2024 90     MCH 02/08/2024 30.2     MCHC 02/08/2024 33.7     RDW 02/08/2024 12.7     MPV 02/08/2024 9.3     Platelets 02/08/2024 297     nRBC 02/08/2024 0     Neutrophils Relative 02/08/2024 55     Immat GRANS % 02/08/2024 1     Lymphocytes Relative 02/08/2024 32     Monocytes Relative 02/08/2024 6     Eosinophils Relative 02/08/2024 5     Basophils Relative 02/08/2024 1     Neutrophils Absolute 02/08/2024 4.24     Immature Grans Absolute 02/08/2024 0.04     Lymphocytes Absolute 02/08/2024 2.46     Monocytes Absolute 02/08/2024 0.48     Eosinophils Absolute 02/08/2024 0.38     Basophils Absolute 02/08/2024 0.08     PSA 02/08/2024 0.23     Hemoglobin A1C 02/08/2024 5.5     EAG 02/08/2024 111     Hepatitis C Ab 02/08/2024 Non-reactive          Radiology Results:   XR chest pa & lateral    Result Date: 2/8/2024  Narrative: CHEST INDICATION:   Acute cough. COMPARISON:  There are no previous examinations available for comparison. EXAM PERFORMED/VIEWS:  XR CHEST PA & LATERAL FINDINGS: Cardiomediastinal silhouette appears unremarkable. The lungs are clear.  No pneumothorax or pleural effusion. Osseous structures appear within normal limits for patient age.     Impression: No acute cardiopulmonary disease. Electronically signed: 02/08/2024 04:16 PM Paulo Garcia MD

## 2024-03-10 DIAGNOSIS — Z79.899 LONG-TERM CURRENT USE OF BENZODIAZEPINE: ICD-10-CM

## 2024-03-11 RX ORDER — ALPRAZOLAM 0.5 MG/1
0.5 TABLET ORAL 3 TIMES DAILY PRN
Qty: 30 TABLET | Refills: 0 | Status: SHIPPED | OUTPATIENT
Start: 2024-03-11

## 2024-03-18 ENCOUNTER — HOSPITAL ENCOUNTER (OUTPATIENT)
Dept: MRI IMAGING | Facility: CLINIC | Age: 69
Discharge: HOME | End: 2024-03-18
Payer: MEDICARE

## 2024-03-18 DIAGNOSIS — H53.9 VISION DISTURBANCE: ICD-10-CM

## 2024-03-18 DIAGNOSIS — H53.40 UNSPECIFIED VISUAL FIELD DEFECTS: ICD-10-CM

## 2024-03-18 DIAGNOSIS — R42 DIZZINESS AND GIDDINESS: ICD-10-CM

## 2024-03-18 DIAGNOSIS — R51.9 NONINTRACTABLE HEADACHE, UNSPECIFIED CHRONICITY PATTERN, UNSPECIFIED HEADACHE TYPE: ICD-10-CM

## 2024-03-18 PROCEDURE — 70544 MR ANGIOGRAPHY HEAD W/O DYE: CPT

## 2024-03-18 PROCEDURE — 70549 MR ANGIOGRAPH NECK W/O&W/DYE: CPT

## 2024-03-18 PROCEDURE — A9585 GADOBUTROL INJECTION: HCPCS | Performed by: FAMILY MEDICINE

## 2024-03-18 RX ORDER — GADOBUTROL 604.72 MG/ML
12 INJECTION INTRAVENOUS
Status: COMPLETED | OUTPATIENT
Start: 2024-03-18 | End: 2024-03-18

## 2024-03-18 RX ADMIN — GADOBUTROL 12 ML: 604.72 INJECTION INTRAVENOUS at 11:11

## 2024-03-24 DIAGNOSIS — I48.0 PAF (PAROXYSMAL ATRIAL FIBRILLATION) (HCC): Primary | ICD-10-CM

## 2024-03-25 ENCOUNTER — TELEPHONE (OUTPATIENT)
Dept: GASTROENTEROLOGY | Facility: CLINIC | Age: 69
End: 2024-03-25

## 2024-03-25 RX ORDER — ATENOLOL 100 MG/1
150 TABLET ORAL 2 TIMES DAILY
Qty: 180 CAPSULE | Refills: 0 | Status: SHIPPED | OUTPATIENT
Start: 2024-03-25

## 2024-04-04 ENCOUNTER — APPOINTMENT (EMERGENCY)
Dept: CT IMAGING | Facility: HOSPITAL | Age: 69
End: 2024-04-04
Payer: MEDICARE

## 2024-04-04 ENCOUNTER — APPOINTMENT (EMERGENCY)
Dept: RADIOLOGY | Facility: HOSPITAL | Age: 69
End: 2024-04-04
Payer: MEDICARE

## 2024-04-04 ENCOUNTER — HOSPITAL ENCOUNTER (EMERGENCY)
Facility: HOSPITAL | Age: 69
Discharge: HOME | End: 2024-04-04
Attending: EMERGENCY MEDICINE
Payer: MEDICARE

## 2024-04-04 ENCOUNTER — HOSPITAL ENCOUNTER (OUTPATIENT)
Dept: GASTROENTEROLOGY | Facility: HOSPITAL | Age: 69
Setting detail: OUTPATIENT SURGERY
Discharge: HOME | End: 2024-04-04

## 2024-04-04 VITALS
SYSTOLIC BLOOD PRESSURE: 147 MMHG | OXYGEN SATURATION: 99 % | TEMPERATURE: 97 F | RESPIRATION RATE: 17 BRPM | DIASTOLIC BLOOD PRESSURE: 86 MMHG | HEART RATE: 61 BPM
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
## 2024-04-04 DIAGNOSIS — S09.90XA CLOSED HEAD INJURY, INITIAL ENCOUNTER: ICD-10-CM

## 2024-04-04 DIAGNOSIS — K21.9 GASTROESOPHAGEAL REFLUX DISEASE WITHOUT ESOPHAGITIS: ICD-10-CM

## 2024-04-04 DIAGNOSIS — Z12.11 COLON CANCER SCREENING: ICD-10-CM

## 2024-04-04 DIAGNOSIS — R49.0 HOARSE VOICE QUALITY: ICD-10-CM

## 2024-04-04 DIAGNOSIS — K58.0 IRRITABLE BOWEL SYNDROME WITH DIARRHEA: ICD-10-CM

## 2024-04-04 DIAGNOSIS — K22.70 BARRETT'S ESOPHAGUS WITHOUT DYSPLASIA: ICD-10-CM

## 2024-04-04 LAB
ANION GAP SERPL CALCULATED.3IONS-SCNC: 8 MMOL/L (ref 4–13)
ATRIAL RATE: 65 BPM
BASOPHILS # BLD AUTO: 0.11 THOUSANDS/ÂΜL (ref 0–0.1)
BASOPHILS NFR BLD AUTO: 2 % (ref 0–1)
BUN SERPL-MCNC: 13 MG/DL (ref 5–25)
CALCIUM SERPL-MCNC: 9.2 MG/DL (ref 8.4–10.2)
CHLORIDE SERPL-SCNC: 101 MMOL/L (ref 96–108)
CO2 SERPL-SCNC: 27 MMOL/L (ref 21–32)
CREAT SERPL-MCNC: 0.83 MG/DL (ref 0.6–1.3)
EOSINOPHIL # BLD AUTO: 0.3 THOUSAND/ÂΜL (ref 0–0.61)
EOSINOPHIL NFR BLD AUTO: 4 % (ref 0–6)
ERYTHROCYTE [DISTWIDTH] IN BLOOD BY AUTOMATED COUNT: 12.3 % (ref 11.6–15.1)
GFR SERPL CREATININE-BSD FRML MDRD: 89 ML/MIN/1.73SQ M
GLUCOSE SERPL-MCNC: 87 MG/DL (ref 65–140)
HCT VFR BLD AUTO: 42 % (ref 36.5–49.3)
HGB BLD-MCNC: 14.7 G/DL (ref 12–17)
IMM GRANULOCYTES # BLD AUTO: 0.02 THOUSAND/UL (ref 0–0.2)
IMM GRANULOCYTES NFR BLD AUTO: 0 % (ref 0–2)
LYMPHOCYTES # BLD AUTO: 1.39 THOUSANDS/ÂΜL (ref 0.6–4.47)
LYMPHOCYTES NFR BLD AUTO: 20 % (ref 14–44)
MCH RBC QN AUTO: 30.9 PG (ref 26.8–34.3)
MCHC RBC AUTO-ENTMCNC: 35 G/DL (ref 31.4–37.4)
MCV RBC AUTO: 88 FL (ref 82–98)
MONOCYTES # BLD AUTO: 0.46 THOUSAND/ÂΜL (ref 0.17–1.22)
MONOCYTES NFR BLD AUTO: 7 % (ref 4–12)
NEUTROPHILS # BLD AUTO: 4.8 THOUSANDS/ÂΜL (ref 1.85–7.62)
NEUTS SEG NFR BLD AUTO: 67 % (ref 43–75)
NRBC BLD AUTO-RTO: 0 /100 WBCS
P AXIS: 70 DEGREES
PLATELET # BLD AUTO: 266 THOUSANDS/UL (ref 149–390)
PMV BLD AUTO: 8.7 FL (ref 8.9–12.7)
POTASSIUM SERPL-SCNC: 4.3 MMOL/L (ref 3.5–5.3)
PR INTERVAL: 192 MS
QRS AXIS: -11 DEGREES
QRSD INTERVAL: 96 MS
QT INTERVAL: 454 MS
QTC INTERVAL: 472 MS
RBC # BLD AUTO: 4.75 MILLION/UL (ref 3.88–5.62)
SODIUM SERPL-SCNC: 136 MMOL/L (ref 135–147)
T WAVE AXIS: 45 DEGREES
VENTRICULAR RATE: 65 BPM
WBC # BLD AUTO: 7.08 THOUSAND/UL (ref 4.31–10.16)

## 2024-04-04 PROCEDURE — 99284 EMERGENCY DEPT VISIT MOD MDM: CPT

## 2024-04-04 PROCEDURE — 93005 ELECTROCARDIOGRAM TRACING: CPT

## 2024-04-04 PROCEDURE — 71045 X-RAY EXAM CHEST 1 VIEW: CPT

## 2024-04-04 PROCEDURE — 80048 BASIC METABOLIC PNL TOTAL CA: CPT | Performed by: PHYSICIAN ASSISTANT

## 2024-04-04 PROCEDURE — 70450 CT HEAD/BRAIN W/O DYE: CPT

## 2024-04-04 PROCEDURE — 93010 ELECTROCARDIOGRAM REPORT: CPT | Performed by: INTERNAL MEDICINE

## 2024-04-04 PROCEDURE — 36415 COLL VENOUS BLD VENIPUNCTURE: CPT | Performed by: PHYSICIAN ASSISTANT

## 2024-04-04 PROCEDURE — 99284 EMERGENCY DEPT VISIT MOD MDM: CPT | Performed by: PHYSICIAN ASSISTANT

## 2024-04-04 PROCEDURE — 85025 COMPLETE CBC W/AUTO DIFF WBC: CPT | Performed by: PHYSICIAN ASSISTANT

## 2024-04-04 PROCEDURE — 72131 CT LUMBAR SPINE W/O DYE: CPT

## 2024-04-04 NOTE — ED PROVIDER NOTES
Emergency Department Trauma Note  Simeon Giron 69 y.o. male MRN: [id number]  Unit/Bed#: ED 29/ED 29 Encounter: [id number]      Trauma Alert: Trauma Acuity: Trauma Evaluation  Model of Arrival:   via    Trauma Team: Current Providers  Attending Provider: Jim Sifuentes MD  Physician Assistant: Vianey Lynch PA-C  Registered Nurse: Ricardo Sanon, RN  Registered Nurse: Harvinder Garza, ELICIA  Consultants:     None      History of Present Illness     Chief Complaint:   Chief Complaint   Patient presents with    Fall     Pt reports slipping on steps earlier this am while on his way to hospital for scheduled colonoscopy. +head strike. Not currently taking thinners due to scheduled procedure. No LOC.     HPI:  Simeon Giron is a 69 y.o. male who presents after a fall this morning.  Mechanism:Details of Incident: fall in parking lot Injury Date: 04/04/24 Injury Time: 0730 Injury Occurence Location - Specify County: [facility]    68yo male with a history of paroxysmal atrial fibrillation on Pradaxa, dilated cardiomyopathy, hypertension, hyperlipidemia, obesity, and GERD presenting with his daughter for evaluation after a fall.  Fall occurred around 7:30 AM this morning.  He was walking down his outdoor steps when he slipped on the snow causing him to fall backwards.  He struck his head and low back against the step.  No LOC.  Patient felt a little dazed immediately after the incident but denies any dizziness currently.  He has a mild frontal headache on arrival and low back discomfort.  He is otherwise asymptomatic and denies any neck pain, chest pain, abdominal pain.  Patient was scheduled to have a colonoscopy this morning and he has not been taking his Pradaxa for the past several days in preparation for this.      History provided by:  Patient   used: No    Fall  Associated symptoms: back pain and headaches    Associated symptoms: no abdominal pain, no chest pain, no neck pain, no  seizures and no vomiting      Review of Systems   Constitutional:  Negative for chills and fever.   HENT:  Negative for drooling and voice change.    Eyes:  Negative for discharge and redness.   Respiratory:  Negative for shortness of breath and stridor.    Cardiovascular:  Negative for chest pain and leg swelling.   Gastrointestinal:  Negative for abdominal pain and vomiting.   Musculoskeletal:  Positive for back pain. Negative for neck pain and neck stiffness.   Skin:  Negative for color change and rash.   Neurological:  Positive for headaches. Negative for seizures and syncope.   Psychiatric/Behavioral:  Negative for confusion. The patient is not nervous/anxious.    All other systems reviewed and are negative.      Historical Information     Immunizations:   Immunization History   Administered Date(s) Administered    COVID-19 MODERNA VACC 0.5 ML IM 03/17/2021, 04/14/2021, 11/02/2021, 05/27/2022    Influenza Quadrivalent Recombinant Preservative Free IM 09/30/2019    Influenza Quadrivalent, 6-35 Months IM 10/31/2017, 09/19/2018    Influenza, Seasonal Vaccine, Quadrivalent, Adjuvanted, .5e 09/22/2020    Influenza, high dose seasonal 0.7 mL 10/15/2021, 11/02/2022, 10/19/2023    Pneumococcal Conjugate 13-Valent 09/08/2020    Pneumococcal Polysaccharide PPV23 09/30/2019       Past Medical History:   Diagnosis Date    Atrial fibrillation (HCC)     GERD (gastroesophageal reflux disease)     Hyperlipidemia     Hypertension     Irritable bowel syndrome     Obesity     Vocal cord dysfunction        Family History   Problem Relation Age of Onset    Lung cancer Mother     Breast cancer additional onset Mother     Cancer Mother     Coronary artery disease Father     Cancer Sister     Lung cancer Sister      Past Surgical History:   Procedure Laterality Date    ANKLE ARTHROPLASTY      CARDIAC ELECTROPHYSIOLOGY MAPPING AND ABLATION      CARPAL TUNNEL RELEASE      CATARACT EXTRACTION Bilateral     LAPAROSCOPIC GASTRIC BANDING       REPLACEMENT TOTAL KNEE Left      Social History     Tobacco Use    Smoking status: Former     Current packs/day: 0.00     Types: Cigarettes     Quit date:      Years since quittin.2    Smokeless tobacco: Never   Vaping Use    Vaping status: Never Used   Substance Use Topics    Alcohol use: Yes     Alcohol/week: 3.0 standard drinks of alcohol     Types: 3 Cans of beer per week     Comment: a week    Drug use: Yes     Types: Marijuana     E-Cigarette/Vaping    E-Cigarette Use Never User      E-Cigarette/Vaping Substances    Nicotine No     THC No     CBD No     Flavoring No     Other No     Unknown No        Family History: non-contributory    Meds/Allergies   Prior to Admission Medications   Prescriptions Last Dose Informant Patient Reported? Taking?   ALPRAZolam (XANAX) 0.5 mg tablet   No No   Sig: Take 1 tablet (0.5 mg total) by mouth 3 (three) times a day as needed for anxiety   Pradaxa 150 MG capsu   No No   Sig: Take 1 capsule (150 mg total) by mouth 2 (two) times a day   Spacer/Aero-Holding Chambers NUNO  Self Yes No   Sig: Use with inhaler.   albuterol (PROVENTIL HFA,VENTOLIN HFA) 90 mcg/act inhaler  Self Yes No   Sig: Inhale 2 puffs   atorvastatin (LIPITOR) 10 mg tablet  Self Yes No   Sig: Take 10 mg by mouth daily   cetirizine (ZyrTEC) 10 mg tablet  Self Yes No   Sig: Take 10 mg by mouth daily   dicyclomine (BENTYL) 10 mg capsule   No No   Sig: Take 1 capsule (10 mg total) by mouth every 6 (six) hours as needed (abdominal cramping)   erythromycin (ILOTYCIN) ophthalmic ointment  Self Yes No   Sig: APPLY A THIN RIBBON OF THE OINTMENT TO THE INSIDE OF THE EYELID OF THE LOWER EYELID IN BOTH EYES AT BEDTIME   gabapentin (NEURONTIN) 300 mg capsule  Self Yes No   Sig: Take 300 mg by mouth 3 (three) times a day   metoclopramide (REGLAN) 10 mg tablet  Self Yes No   Sig: Take 10 mg by mouth 4 (four) times a day   metoprolol succinate (TOPROL-XL) 50 mg 24 hr tablet  Self Yes No   Sig: Take 50 mg by mouth  daily   pantoprazole (PROTONIX) 40 mg tablet  Self No No   Sig: Take 1 tablet (40 mg total) by mouth 2 (two) times a day   sertraline (ZOLOFT) 100 mg tablet  Self No No   Sig: Take 1 tablet (100 mg total) by mouth daily   sucralfate (CARAFATE) 1 g tablet  Self Yes No   Sig: Take 1 g by mouth 4 (four) times a day      Facility-Administered Medications: None       Allergies   Allergen Reactions    Cat Hair Extract Sneezing    Dust Mite Extract Sneezing    Pollen Extract Sneezing     Seasonal allergies       PHYSICAL EXAM    PE limited by: n/a    Objective   Vitals:   First set: Temperature: (!) 97 °F (36.1 °C) (04/04/24 0846)  Pulse: 70 (04/04/24 0846)  Respirations: 19 (04/04/24 0846)  Blood Pressure: 137/81 (04/04/24 0846)  SpO2: 98 % (04/04/24 0846)    Primary Survey:   (A) Airway: intact  (B) Breathing: bilateral breath sounds  (C) Circulation: Pulses:   normal  (D) Disabliity:  GCS Total:  15  (E) Expose:  Completed    Secondary Survey: (Click on Physical Exam tab above)  Physical Exam  Vitals and nursing note reviewed.   Constitutional:       General: He is not in acute distress.     Appearance: He is well-developed. He is not diaphoretic.   HENT:      Head: Normocephalic and atraumatic.      Comments: No external signs of head trauma     Right Ear: External ear normal.      Left Ear: External ear normal.   Eyes:      General: No scleral icterus.        Right eye: No discharge.         Left eye: No discharge.      Conjunctiva/sclera: Conjunctivae normal.      Pupils: Pupils are equal, round, and reactive to light.   Neck:      Comments: No cervical spine tenderness with full ROM  Cardiovascular:      Rate and Rhythm: Normal rate and regular rhythm.      Heart sounds: Normal heart sounds. No murmur heard.  Pulmonary:      Effort: Pulmonary effort is normal. No respiratory distress.      Breath sounds: Normal breath sounds. No stridor. No wheezing or rales.   Abdominal:      General: Bowel sounds are normal. There  is no distension.      Palpations: Abdomen is soft.      Tenderness: There is no abdominal tenderness. There is no guarding.   Musculoskeletal:         General: No deformity. Normal range of motion.      Cervical back: Normal range of motion and neck supple. No tenderness.      Lumbar back: Tenderness present. No swelling, edema, deformity or signs of trauma.   Skin:     General: Skin is warm and dry.   Neurological:      General: No focal deficit present.      Mental Status: He is alert. He is not disoriented.      GCS: GCS eye subscore is 4. GCS verbal subscore is 5. GCS motor subscore is 6.   Psychiatric:         Behavior: Behavior normal.         Cervical spine cleared by clinical criteria? Yes     Invasive Devices       None                   Lab Results:   Results Reviewed       Procedure Component Value Units Date/Time    Basic metabolic panel [[id number]] Collected: 04/04/24 0911    Lab Status: Final result Specimen: Blood from Arm, Left Updated: 04/04/24 0936     Sodium 136 mmol/L      Potassium 4.3 mmol/L      Chloride 101 mmol/L      CO2 27 mmol/L      ANION GAP 8 mmol/L      BUN 13 mg/dL      Creatinine 0.83 mg/dL      Glucose 87 mg/dL      Calcium 9.2 mg/dL      eGFR 89 ml/min/1.73sq m     Narrative:      National Kidney Disease Foundation guidelines for Chronic Kidney Disease (CKD):     Stage 1 with normal or high GFR (GFR > 90 mL/min/1.73 square meters)    Stage 2 Mild CKD (GFR = 60-89 mL/min/1.73 square meters)    Stage 3A Moderate CKD (GFR = 45-59 mL/min/1.73 square meters)    Stage 3B Moderate CKD (GFR = 30-44 mL/min/1.73 square meters)    Stage 4 Severe CKD (GFR = 15-29 mL/min/1.73 square meters)    Stage 5 End Stage CKD (GFR <15 mL/min/1.73 square meters)  Note: GFR calculation is accurate only with a steady state creatinine    CBC and differential [[id number]]  (Abnormal) Collected: 04/04/24 0911    Lab Status: Final result Specimen: Blood from Arm, Left Updated: 04/04/24 0918     WBC 7.08  Thousand/uL      RBC 4.75 Million/uL      Hemoglobin 14.7 g/dL      Hematocrit 42.0 %      MCV 88 fL      MCH 30.9 pg      MCHC 35.0 g/dL      RDW 12.3 %      MPV 8.7 fL      Platelets 266 Thousands/uL      nRBC 0 /100 WBCs      Neutrophils Relative 67 %      Immature Grans % 0 %      Lymphocytes Relative 20 %      Monocytes Relative 7 %      Eosinophils Relative 4 %      Basophils Relative 2 %      Neutrophils Absolute 4.80 Thousands/µL      Absolute Immature Grans 0.02 Thousand/uL      Absolute Lymphocytes 1.39 Thousands/µL      Absolute Monocytes 0.46 Thousand/µL      Eosinophils Absolute 0.30 Thousand/µL      Basophils Absolute 0.11 Thousands/µL                    Imaging Studies:   Direct to CT: Yes  TRAUMA - CT head wo contrast   Final Result by Licha Ghotra MD (04/04 0953)      No acute intracranial abnormality.                  Workstation performed: [id number]         TRAUMA - CT spine lumbar wo contrast   Final Result by Licha Ghotra MD (04/04 0959)      No evidence of lumbar spinal fracture or traumatic malalignment.   Degenerative changes, as above.         Workstation performed: [id number]         XR Trauma chest portable   Final Result by Licha Ghotra MD (04/04 0953)      No acute cardiopulmonary disease.            Workstation performed: [id number]               Procedures  ECG 12 Lead Documentation Only    Date/Time: 4/4/2024 4:30 PM    Performed by: Vianey Lynch PA-C  Authorized by: Vianey Lynch PA-C    Indications / Diagnosis:  Fall  ECG reviewed by me, the ED Provider: yes    Patient location:  ED  Rate:     ECG rate:  65    ECG rate assessment: normal    Rhythm:     Rhythm: sinus rhythm    Ectopy:     Ectopy: PAC    QRS:     QRS axis:  Normal  Conduction:     Conduction: abnormal      Abnormal conduction: incomplete RBBB    ST segments:     ST segments:  Normal  T waves:     T waves: normal             ED Course           Medical Decision Making  69yoM here after a  mechanical fall this morning. Slipped and fell on outdoor steps. +Head strike, no LOC. C/o mild headache and low back pain. On Pradaxa but has not taken this for a few days. He is awake, alert, with stable vitals. No external signs of head trauma on exam. Cervical spine cleared via NEXUS criteria.    Initial ED plan: Check CBC, BMP, CXR, CT head, and CT lumbar spine.    Final assessment: Labs unremarkable. Imaging negative for acute traumatic injuries. Patient asymptomatic on reassessment. He is stable for discharge. Advised close PCP follow-up. ED return precautions discussed. Patient expressed understanding and is agreeable to plan. Patient discharged in stable condition.        Problems Addressed:  Closed head injury, initial encounter: acute illness or injury  Fall on steps, initial encounter: acute illness or injury    Amount and/or Complexity of Data Reviewed  Labs: ordered.  Radiology: ordered.  ECG/medicine tests: ordered and independent interpretation performed.                Disposition  [facility] Transfer: No  Final diagnoses:   Fall on steps, initial encounter   Closed head injury, initial encounter     Time reflects when diagnosis was documented in both MDM as applicable and the Disposition within this note       Time User Action Codes Description Comment    4/4/2024 10:17 AM Vianey Lynch Add [W10.8XXA] Fall on steps, initial encounter     4/4/2024 10:17 AM Vianey Lynch Add [S09.90XA] Closed head injury, initial encounter           ED Disposition       ED Disposition   Discharge    Condition   Stable    Date/Time   Thu Apr 4, 2024 10:16 AM    Comment   Simeon Giron discharge to home/self care.                   Follow-up Information       Follow up With Specialties Details Why Contact Info Additional Information    Liliane Chapa MD Family Medicine Schedule an appointment as soon as possible for a visit   [address] [place] [id number]  [phone]       [facility]  [facility] Emergency Department Emergency Medicine  If symptoms worsen [address] [facility] [id number] [phone] [facility] Emergency Department, [address] [place], [place], [address]          Discharge Medication List as of 4/4/2024 10:17 AM        CONTINUE these medications which have NOT CHANGED    Details   albuterol (PROVENTIL HFA,VENTOLIN HFA) 90 mcg/act inhaler Inhale 2 puffs, Starting Thu 3/24/2022, Historical Med      ALPRAZolam (XANAX) 0.5 mg tablet Take 1 tablet (0.5 mg total) by mouth 3 (three) times a day as needed for anxiety, Starting Mon 3/11/2024, Normal      atorvastatin (LIPITOR) 10 mg tablet Take 10 mg by mouth daily, Starting Thu 3/17/2022, Historical Med      cetirizine (ZyrTEC) 10 mg tablet Take 10 mg by mouth daily, Historical Med      dicyclomine (BENTYL) 10 mg capsule Take 1 capsule (10 mg total) by mouth every 6 (six) hours as needed (abdominal cramping), Starting Thu 3/7/2024, Normal      erythromycin (ILOTYCIN) ophthalmic ointment APPLY A THIN RIBBON OF THE OINTMENT TO THE INSIDE OF THE EYELID OF THE LOWER EYELID IN BOTH EYES AT BEDTIME, Historical Med      gabapentin (NEURONTIN) 300 mg capsule Take 300 mg by mouth 3 (three) times a day, Starting Thu 2/17/2022, Historical Med      metoclopramide (REGLAN) 10 mg tablet Take 10 mg by mouth 4 (four) times a day, Historical Med      metoprolol succinate (TOPROL-XL) 50 mg 24 hr tablet Take 50 mg by mouth daily, Starting Wed 12/29/2021, Historical Med      pantoprazole (PROTONIX) 40 mg tablet Take 1 tablet (40 mg total) by mouth 2 (two) times a day, Starting Thu 5/5/2022, Normal      Pradaxa 150 MG capsu Take 1 capsule (150 mg total) by mouth 2 (two) times a day, Starting Mon 3/25/2024, Normal      sertraline (ZOLOFT) 100 mg tablet Take 1 tablet (100 mg total) by mouth daily, Starting Wed 2/14/2024, Normal      Spacer/Aero-Holding Chambers NUNO Use with inhaler., Historical Med       sucralfate (CARAFATE) 1 g tablet Take 1 g by mouth 4 (four) times a day, Starting Wed 12/29/2021, Historical Med           No discharge procedures on file.    PDMP Review         Value Time User    PDMP Reviewed  Yes 3/11/2024  9:41 AM Liliane Chapa MD            ED Provider  Electronically Signed by           Vianey Lynch PA-C  04/04/24 1783

## 2024-04-05 ENCOUNTER — VBI (OUTPATIENT)
Dept: FAMILY MEDICINE CLINIC | Facility: CLINIC | Age: 69
End: 2024-04-05

## 2024-04-05 NOTE — TELEPHONE ENCOUNTER
04/05/24 10:50 AM    Patient contacted post ED visit, first outreach attempt made. Message was left for patient to return a call to the VBI Department at [facility]: Phone [phone].    Thank you.  Jesenia Chirinos MA  [facility]

## 2024-04-06 DIAGNOSIS — Z79.899 LONG-TERM CURRENT USE OF BENZODIAZEPINE: ICD-10-CM

## 2024-04-06 DIAGNOSIS — R11.0 NAUSEA: Primary | ICD-10-CM

## 2024-04-08 RX ORDER — ALPRAZOLAM 0.5 MG/1
0.5 TABLET ORAL 3 TIMES DAILY PRN
Qty: 30 TABLET | Refills: 0 | Status: SHIPPED | OUTPATIENT
Start: 2024-04-08

## 2024-04-08 RX ORDER — METOCLOPRAMIDE 10 MG/1
10 TABLET ORAL 4 TIMES DAILY
Qty: 30 TABLET | Refills: 0 | Status: SHIPPED | OUTPATIENT
Start: 2024-04-08

## 2024-04-08 NOTE — TELEPHONE ENCOUNTER
04/08/24 10:40 AM    Patient contacted post ED visit, VBI department spoke with patient/caregiver and outreach was successful.    Thank you.  Jesenia Chirinos MA  [facility]

## 2024-04-15 DIAGNOSIS — Z79.899 LONG-TERM CURRENT USE OF BENZODIAZEPINE: ICD-10-CM

## 2024-04-15 DIAGNOSIS — R11.0 NAUSEA: ICD-10-CM

## 2024-04-15 DIAGNOSIS — I48.0 PAF (PAROXYSMAL ATRIAL FIBRILLATION) (HCC): Primary | ICD-10-CM

## 2024-04-15 RX ORDER — METOPROLOL SUCCINATE 50 MG/1
50 TABLET, EXTENDED RELEASE ORAL DAILY
Qty: 90 TABLET | Refills: 1 | Status: SHIPPED | OUTPATIENT
Start: 2024-04-15

## 2024-04-15 RX ORDER — ALPRAZOLAM 0.5 MG/1
0.5 TABLET ORAL 3 TIMES DAILY PRN
Qty: 30 TABLET | Refills: 0 | Status: SHIPPED | OUTPATIENT
Start: 2024-04-15

## 2024-04-15 RX ORDER — METOCLOPRAMIDE 10 MG/1
10 TABLET ORAL 4 TIMES DAILY
Qty: 30 TABLET | Refills: 0 | Status: SHIPPED | OUTPATIENT
Start: 2024-04-15

## 2024-04-23 ENCOUNTER — OFFICE VISIT (OUTPATIENT)
Dept: PULMONOLOGY | Facility: CLINIC | Age: 69
End: 2024-04-23
Payer: MEDICARE

## 2024-04-23 VITALS
SYSTOLIC BLOOD PRESSURE: 124 MMHG | DIASTOLIC BLOOD PRESSURE: 66 MMHG | WEIGHT: 275 LBS | HEART RATE: 50 BPM | TEMPERATURE: 97.4 F | OXYGEN SATURATION: 97 % | HEIGHT: 68 IN | BODY MASS INDEX: 41.68 KG/M2

## 2024-04-23 DIAGNOSIS — R06.09 DYSPNEA ON EXERTION: ICD-10-CM

## 2024-04-23 DIAGNOSIS — J44.9 CHRONIC OBSTRUCTIVE PULMONARY DISEASE, UNSPECIFIED COPD TYPE (HCC): Primary | ICD-10-CM

## 2024-04-23 PROCEDURE — 99205 OFFICE O/P NEW HI 60 MIN: CPT | Performed by: INTERNAL MEDICINE

## 2024-04-23 RX ORDER — ALBUTEROL SULFATE AND BUDESONIDE 90; 80 UG/1; UG/1
2 AEROSOL, METERED RESPIRATORY (INHALATION) 4 TIMES DAILY PRN
Qty: 10.7 G | Refills: 0 | Status: SHIPPED | COMMUNITY
Start: 2024-04-23 | End: 2024-05-07

## 2024-04-23 NOTE — PROGRESS NOTES
Attending Statement:  I have seen and examined the patient. I have discussed the patient's care with the pulmonary fellow.    I agree with the findings, assessment, and plan as outlined in the note by Dr Cook with the addition of the following:    Imaging Studies were reviewed personally on the PAC system:     CXR 4/2024: clear lungs  No PFTs  No sleep study    EXAMINATION:   Lungs distant, no wheezing  1+ LE edema     A/P:  69M hx CHF, Pafib, GERD, anxiety depression, RED not on CPAP, obesity, former smoker, presenting for evaluation of dyspnea.     # Dyspnea  # Former smoker  45 Py smoker, Quit 36 yrs ago  Eos 380  SOB for a few years with exertion and minimal activity and with bending down. Suspect deconditioning, obesity, heart disease, possible COPD. Does have season allergies and allergies to his own cat. No COPD or asthma exacerbations. Normal recent imaging.   Occupational exposures as powerplant     - PFTs w BD  - trial of Air Suppra  - no indication for lung ca screening  - continue cardiology follow up  - counseled on limiting marijuana use    # RED  # Obesity  BMI 41, former reports RED but not on therapy, ordered for split study a year ago but no results available (supposedly negative). Bicarb not elevated. Pt says he lost weight (though still obese)  - rec obtaining records from sleep study from [facility]    I have spent a total time of 60 minutes on 04/23/24 in caring for this patient including Risks and benefits of tx options, Instructions for management, Patient and family education, Importance of tx compliance, Risk factor reductions, Impressions, Counseling / Coordination of care, Documenting in the medical record, Reviewing / ordering tests, medicine, procedures  , and Obtaining or reviewing history  .     Rhiannon Moreno MD  [facility]

## 2024-04-23 NOTE — PROGRESS NOTES
Pulmonary Consultation   Simeon Giron 69 y.o. male MRN: [id number]  4/23/2024      Reason for Consultation:  SOB      Assessment/Plan:  Shortness of breath   Former smoker   Patient with shortness of breath with minimal activity over the past year. No prior history of lung disease. Former smoker, quitting ~30 years ago. Differential diagnosis includes obesity/deconditioning, cardiac disease (heart failure/arrhythmia), COPD given smoking history.   - Complete PFT w/ BD   - Sample of Air Suppra given   - Follow up with cardiology   - Increase physical activity   - No indication for lung cancer screening     RED   Patient with prior history of RED, however, not on CPAP therapy. He reports recent sleep study after losing approximately 40lb was negative. He has current BMI of 41.81.   - Asked patient to get records from [facility]   - Patient is not currently interested in additional testing     Follow up in 2-3 months.     History of Present Illness   HPI:  Simeon Giron is a 69 y.o. male with paroxysmal atrial fibrillation on Pradaxa, CHF, hypertension, hyperlipidemia, obesity, RED, GERD who presents for the evaluation of shortness of breath.    Patient reports shortness of breath as been presents for the past couple of years. Initially with exertion, but now has progressed to minimal exertion. He also notes shallow breathing and difficulty finishing sentences and trouble singing. He gets short of breath with climbing 5 stairs. He has an intermittent nonproductive cough. He has no prior hospitalizations/ED evaluations for his breathing, steroid use, or antibiotics. He has an as needed albuterol inhaler that he uses infrequently as he believes it does not help. He denied prior diagnosis of pulmonary disease such as COPD/asthma. He is a former cigarette smoker, started at the age of 16 yo and quit 30 years ago. He smoked on average 2.5 PPD. He will at times smoke mariajuana for anxiety and to help him sleep. He  reports allergies to dusts and cats. He does have cat and dog in the home. Patient does report a history of prior steam inhalation at work with injury to the esophagus and larynx. He is worried that this could have effected his breathing. He denied fevers, chills, CP, abdominal pain, n/v/d, peripheral edema.     Regarding history of sleep apnea, he reports no longer having the diagnosis as he lost approximately 40 lb. He states that he had an in office sleep study through the [facility] [employer] system a year ago and was told that it was normal, and he no longer needed the sleep study. He is not currently interested in repeating studies at this time.       Review of systems:  12 point review of systems was completed and was otherwise negative except as listed in HPI.    Historical Information   Past Medical History:   Diagnosis Date    Atrial fibrillation (HCC)     GERD (gastroesophageal reflux disease)     Hyperlipidemia     Hypertension     Irritable bowel syndrome     Obesity     Vocal cord dysfunction      Past Surgical History:   Procedure Laterality Date    ANKLE ARTHROPLASTY      CARDIAC ELECTROPHYSIOLOGY MAPPING AND ABLATION      CARPAL TUNNEL RELEASE      CATARACT EXTRACTION Bilateral     LAPAROSCOPIC GASTRIC BANDING      REPLACEMENT TOTAL KNEE Left      Family History   Problem Relation Age of Onset    Lung cancer Mother     Breast cancer additional onset Mother     Cancer Mother     Coronary artery disease Father     Cancer Sister     Lung cancer Sister        Occupational History:    - Steam and chemical exposure. Never wore PPE     Social History:   Social History     Tobacco Use   Smoking Status Former    Current packs/day: 0.00    Average packs/day: 2.5 packs/day for 18.0 years (45.0 ttl pk-yrs)    Types: Cigarettes    Start date:     Quit date:     Years since quittin.3   Smokeless Tobacco Never     Marijuana - nightly or when nervous - smokes with pipe     Meds/Allergies      Current Outpatient Medications:     albuterol (PROVENTIL HFA,VENTOLIN HFA) 90 mcg/act inhaler, Inhale 2 puffs, Disp: , Rfl:     Albuterol-Budesonide (Airsupra) 90-80 MCG/ACT AERO, Inhale 2 puffs 4 (four) times a day as needed (dyspnea) for up to 14 days, Disp: 10.7 g, Rfl: 0    ALPRAZolam (XANAX) 0.5 mg tablet, Take 1 tablet (0.5 mg total) by mouth 3 (three) times a day as needed for anxiety, Disp: 30 tablet, Rfl: 0    atorvastatin (LIPITOR) 10 mg tablet, Take 10 mg by mouth daily, Disp: , Rfl:     cetirizine (ZyrTEC) 10 mg tablet, Take 10 mg by mouth daily, Disp: , Rfl:     dicyclomine (BENTYL) 10 mg capsule, Take 1 capsule (10 mg total) by mouth every 6 (six) hours as needed (abdominal cramping), Disp: 90 capsule, Rfl: 1    erythromycin (ILOTYCIN) ophthalmic ointment, APPLY A THIN RIBBON OF THE OINTMENT TO THE INSIDE OF THE EYELID OF THE LOWER EYELID IN BOTH EYES AT BEDTIME, Disp: , Rfl:     gabapentin (NEURONTIN) 300 mg capsule, Take 300 mg by mouth 3 (three) times a day, Disp: , Rfl:     metoclopramide (REGLAN) 10 mg tablet, Take 1 tablet (10 mg total) by mouth 4 (four) times a day, Disp: 30 tablet, Rfl: 0    metoprolol succinate (TOPROL-XL) 50 mg 24 hr tablet, Take 1 tablet (50 mg total) by mouth daily, Disp: 90 tablet, Rfl: 1    pantoprazole (PROTONIX) 40 mg tablet, Take 1 tablet (40 mg total) by mouth 2 (two) times a day, Disp: 60 tablet, Rfl: 11    Pradaxa 150 MG capsu, Take 1 capsule (150 mg total) by mouth 2 (two) times a day, Disp: 180 capsule, Rfl: 0    sertraline (ZOLOFT) 100 mg tablet, Take 1 tablet (100 mg total) by mouth daily, Disp: 90 tablet, Rfl: 1    Spacer/Aero-Holding Chambers NUNO, Use with inhaler., Disp: , Rfl:     sucralfate (CARAFATE) 1 g tablet, Take 1 g by mouth 4 (four) times a day, Disp: , Rfl:   Allergies   Allergen Reactions    Cat Hair Extract Sneezing    Dust Mite Extract Sneezing    Pollen Extract Sneezing     Seasonal allergies       Vitals: Blood pressure 124/66, pulse (!)  "50, temperature (!) 97.4 °F (36.3 °C), temperature source Tympanic, height 5' 8\" (1.727 m), weight 125 kg (275 lb), SpO2 97%., Body mass index is 41.81 kg/m². Oxygen Therapy  SpO2: 97 %  Oxygen Therapy: None (Room air)    Physical Exam  General: No acute distress  HENT: Normocephalic, atraumatic.  PERRL, EOMI, Moist mucous membranes.  Neck: Supple  Lungs: Clear to auscultation bilaterally, no wheezes, rales, rhonchi.  On room air  Heart: Regular rate and rhythm, S1-S2 present, no murmurs rubs or gallops  Extremities: Warm and well perfused, no cyanosis, clubbing. Bilateral pitting edema to shin  Skin: Warm, dry, no rashes or lesions  Neurologic: No focal neurologic deficits    Labs: I have personally reviewed pertinent lab results.  Lab Results   Component Value Date    WBC 7.08 04/04/2024    HGB 14.7 04/04/2024    HCT 42.0 04/04/2024    MCV 88 04/04/2024     04/04/2024     Lab Results   Component Value Date    CALCIUM 9.2 04/04/2024    K 4.3 04/04/2024    CO2 27 04/04/2024     04/04/2024    BUN 13 04/04/2024    CREATININE 0.83 04/04/2024     No results found for: \"IGE\"  Lab Results   Component Value Date    ALT 12 02/08/2024    AST 18 02/08/2024    ALKPHOS 64 02/08/2024       Imaging and other studies: I have personally reviewed pertinent reports.   and I have personally reviewed pertinent films in PACS  XR Trauma chest portable  Result Date: 4/4/2024  Impression: No acute cardiopulmonary disease. Workstation performed: [id number]     TRAUMA - CT head wo contrast    Result Date: 4/4/2024  Impression: No acute intracranial abnormality. Workstation performed: [id number]       Pulmonary function testing:   No prior PFTs      EKG, Pathology, and Other Studies: I have personally reviewed pertinent reports.          Disclaimer: Portions of the record may have been created with voice recognition software. Occasional wrong word or \"sound a like\" substitutions may have occurred due to the inherent limitations " of voice recognition software. Careful consideration should be taken to recognize, using context, where substitutions have occurred.    Natalio Cook DO   Pulmonary & Critical Care Medicine Fellow PGY-IV  [facility]

## 2024-05-02 ENCOUNTER — ANESTHESIA (OUTPATIENT)
Dept: GASTROENTEROLOGY | Facility: HOSPITAL | Age: 69
End: 2024-05-02

## 2024-05-02 ENCOUNTER — TELEPHONE (OUTPATIENT)
Dept: GASTROENTEROLOGY | Facility: CLINIC | Age: 69
End: 2024-05-02

## 2024-05-02 ENCOUNTER — HOSPITAL ENCOUNTER (OUTPATIENT)
Dept: GASTROENTEROLOGY | Facility: HOSPITAL | Age: 69
Setting detail: OUTPATIENT SURGERY
Discharge: HOME | End: 2024-05-02
Admitting: INTERNAL MEDICINE
Payer: MEDICARE

## 2024-05-02 ENCOUNTER — ANESTHESIA EVENT (OUTPATIENT)
Dept: GASTROENTEROLOGY | Facility: HOSPITAL | Age: 69
End: 2024-05-02

## 2024-05-02 VITALS
BODY MASS INDEX: 40.03 KG/M2 | WEIGHT: 264.11 LBS | TEMPERATURE: 97.6 F | HEART RATE: 73 BPM | OXYGEN SATURATION: 96 % | HEIGHT: 68 IN | RESPIRATION RATE: 24 BRPM | SYSTOLIC BLOOD PRESSURE: 156 MMHG | DIASTOLIC BLOOD PRESSURE: 91 MMHG

## 2024-05-02 DIAGNOSIS — K21.9 GASTROESOPHAGEAL REFLUX DISEASE WITHOUT ESOPHAGITIS: Primary | ICD-10-CM

## 2024-05-02 DIAGNOSIS — K58.0 IRRITABLE BOWEL SYNDROME WITH DIARRHEA: ICD-10-CM

## 2024-05-02 DIAGNOSIS — Z86.010 HISTORY OF COLON POLYPS: ICD-10-CM

## 2024-05-02 PROCEDURE — G0121 COLON CA SCRN NOT HI RSK IND: HCPCS | Performed by: INTERNAL MEDICINE

## 2024-05-02 PROCEDURE — 88305 TISSUE EXAM BY PATHOLOGIST: CPT | Performed by: PATHOLOGY

## 2024-05-02 PROCEDURE — 43239 EGD BIOPSY SINGLE/MULTIPLE: CPT | Performed by: INTERNAL MEDICINE

## 2024-05-02 RX ORDER — LIDOCAINE HYDROCHLORIDE 20 MG/ML
INJECTION, SOLUTION EPIDURAL; INFILTRATION; INTRACAUDAL; PERINEURAL AS NEEDED
Status: DISCONTINUED | OUTPATIENT
Start: 2024-05-02 | End: 2024-05-02

## 2024-05-02 RX ORDER — SODIUM CHLORIDE, SODIUM LACTATE, POTASSIUM CHLORIDE, CALCIUM CHLORIDE 600; 310; 30; 20 MG/100ML; MG/100ML; MG/100ML; MG/100ML
INJECTION, SOLUTION INTRAVENOUS CONTINUOUS PRN
Status: DISCONTINUED | OUTPATIENT
Start: 2024-05-02 | End: 2024-05-02

## 2024-05-02 RX ORDER — PROPOFOL 10 MG/ML
INJECTION, EMULSION INTRAVENOUS AS NEEDED
Status: DISCONTINUED | OUTPATIENT
Start: 2024-05-02 | End: 2024-05-02

## 2024-05-02 RX ADMIN — SODIUM CHLORIDE, SODIUM LACTATE, POTASSIUM CHLORIDE, AND CALCIUM CHLORIDE: .6; .31; .03; .02 INJECTION, SOLUTION INTRAVENOUS at 12:30

## 2024-05-02 RX ADMIN — PROPOFOL 50 MG: 10 INJECTION, EMULSION INTRAVENOUS at 13:05

## 2024-05-02 RX ADMIN — PROPOFOL 50 MG: 10 INJECTION, EMULSION INTRAVENOUS at 13:14

## 2024-05-02 RX ADMIN — LIDOCAINE HYDROCHLORIDE 100 MG: 20 INJECTION, SOLUTION EPIDURAL; INFILTRATION; INTRACAUDAL at 13:03

## 2024-05-02 RX ADMIN — PROPOFOL 50 MG: 10 INJECTION, EMULSION INTRAVENOUS at 13:10

## 2024-05-02 RX ADMIN — PROPOFOL 100 MG: 10 INJECTION, EMULSION INTRAVENOUS at 13:03

## 2024-05-02 NOTE — TELEPHONE ENCOUNTER
----- Message from Abilio Wyatt III, MD sent at 5/2/2024  1:11 PM EDT -----  Pls get his vonoprazan approved

## 2024-05-02 NOTE — ANESTHESIA POSTPROCEDURE EVALUATION
Post-Op Assessment Note    CV Status:  Stable  Pain Score: 0    Pain management: adequate       Mental Status:  Sleepy and arousable   Hydration Status:  Stable   PONV Controlled:  Controlled   Airway Patency:  Patent     Post Op Vitals Reviewed: Yes    No anethesia notable event occurred.    Staff: CRNA               /82 (05/02/24 1321)    Temp      Pulse 76 (05/02/24 1321)   Resp 17 (05/02/24 1321)    SpO2 95 % (05/02/24 1321)

## 2024-05-02 NOTE — TELEPHONE ENCOUNTER
Prior auth is needed  for Vonoprazan Fumarate 20 MG TABS Sig: Take 20 mg  by mouth in the morning. Thanks.

## 2024-05-02 NOTE — ANESTHESIA PREPROCEDURE EVALUATION
Procedure:  EGD  COLONOSCOPY  ASSESSMENT AND PLAN:       1. Cottrell's esophagus without dysplasia  2. Gastroesophageal reflux disease without esophagitis  3. Hoarse voice quality  - He has a history of chronic GERD with prior EGD in 2020 showing Cottrell's and then EGD in 2021 negative for Cottrell's, presenting for repeat EGD given hoarse voice quality  - We will repeat his EGD with biopsies  - Continue protonix 40 mg BID, reglan as needed   -Refer to ENT for hoarse voice     4. Irritable bowel syndrome with diarrhea  5. Colon cancer screening  - History of IBS-D with recent worsening after his wife's passing in December  - Likely due for colonoscopy this year as well given last colonoscopy was at least 5 years ago in [place]  - Dicyclomine PRN  - Start fiber supplementation to bulk BMs and reduce frequency  - Schedule colonoscopy with miralax prep to update colon cancer screening     ______________________________________________________________________     SUBJECTIVE:  Simeon is a 68 yo M with a PMH of Afib on Pradaxa, IBS-D, GERD, presenting to schedule a colonoscopy and possible EGD per his PCP.  He believes his last colonoscopy was about 5 years ago but done in [place].  He does not remember if he had any polyps.  He has a history of IBS with diarrhea and recently went through flareup in December after his wife passed away.  He was having a lot of abdominal cramping and was given dicyclomine which has really been helpful to him.  He has not really needed to use this recently but has it on hand.  He is having about 4-5 bowel movements a day that are small and on the loose side with some urgency.  Normally he would have 1-2 bowel movements a day so this is worse than his usual.  He denies any black or bloody bowel movements.  He also has a longstanding reflux for which she takes Protonix 40 mg twice daily and he is on Reglan as needed.  He had 2 EGDs with [facility], the first in 2020 showing Cottrell's esophagus and  the second 1 in  being negative for Cottrell's esophagus.  Recently has been dealing with a hoarse voice quality which is why his PCP wanted him to get an upper endoscopy.  He has not seen ENT recently.  He reports that he had a work injury years ago related to significant steam exposure and it was thought that he had some kind of injury to his vocal cords from this, per the patient.  Relevant Problems   CARDIO   (+) Hyperlipidemia   (+) PAF (paroxysmal atrial fibrillation) (HCC)      GI/HEPATIC   (+) Gastroesophageal reflux disease without esophagitis      NEURO/PSYCH   (+) Anxiety disorder, unspecified   (+) Mild depression   (+) Nonintractable headache   (+) Vision disturbance        Physical Exam    Airway    Mallampati score: III  TM Distance: >3 FB  Neck ROM: full     Dental       Cardiovascular  Cardiovascular exam normal    Pulmonary  Pulmonary exam normal     Other Findings      History Comments History Comments   GERD (gastroesophageal reflux disease)  Obesity    Atrial fibrillation (HCC)  Hyperlipidemia    Hypertension  Irritable bowel syndrome    Vocal cord dysfunction        Surgical History   Current as of 24 1151  CARDIAC ELECTROPHYSIOLOGY MAPPING AND ABLATION ANKLE ARTHROPLASTY   LAPAROSCOPIC GASTRIC BANDING REPLACEMENT TOTAL KNEE   CATARACT EXTRACTION CARPAL TUNNEL RELEASE     Substance History   Current as of 24 1151  Smoking Status: Former - 45 pack years   Quit Smokin88   Smokeless Tobacco Status: Never   Alcohol use: Yes; 3.0 standard drinks of alcohol per week   Drug use: Marijuana     Problem List   Current as of 24 1151  Anxiety disorder, unspecified   Balance disorder   Cottrell's esophagus   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)   Body mass index (BMI) of 40.0 to 44.9 in adult (HCC)   Dilated idiopathic cardiomyopathy (HCC)   Gastroesophageal reflux disease without esophagitis   History of diverticulitis   Hyperlipidemia   Irritable bowel syndrome  with diarrhea   Marijuana use   Mild depression   Nonintractable headache   PAF (paroxysmal atrial fibrillation) (HCC)   Peripheral neuropathy   Psychophysiological insomnia   S/P ablation of atrial fibrillation   Unspecified visual field defects   Vision disturbance     Anesthesia Plan  ASA Score- 3     Anesthesia Type- IV sedation with anesthesia with ASA Monitors.         Additional Monitors:     Airway Plan:            Plan Factors-Exercise tolerance (METS): >4 METS.    Chart reviewed. EKG reviewed. Imaging results reviewed. Existing labs reviewed. Patient summary reviewed.    Patient is not a current smoker.              Induction- intravenous.    Postoperative Plan-     Informed Consent- Anesthetic plan and risks discussed with patient.  I personally reviewed this patient with the CRNA. Discussed and agreed on the Anesthesia Plan with the CRNA..

## 2024-05-02 NOTE — H&P
"History and Physical -  [facility]  Simeon Giron 69 y.o. male MRN: [id number]                  HPI: Simeon Giron is a 69 y.o. year old male who presents for endoscopy and colonoscopy for GERD abdominal pain change in bowel habits and screening      REVIEW OF SYSTEMS: Per the HPI, and otherwise unremarkable.    Historical Information   Past Medical History:   Diagnosis Date    Atrial fibrillation (HCC)     GERD (gastroesophageal reflux disease)     Hyperlipidemia     Hypertension     Irritable bowel syndrome     Obesity     Vocal cord dysfunction      Past Surgical History:   Procedure Laterality Date    ANKLE ARTHROPLASTY      CARDIAC ELECTROPHYSIOLOGY MAPPING AND ABLATION      CARPAL TUNNEL RELEASE      CATARACT EXTRACTION Bilateral     LAPAROSCOPIC GASTRIC BANDING      REPLACEMENT TOTAL KNEE Left      Social History   Social History     Substance and Sexual Activity   Alcohol Use Yes    Alcohol/week: 3.0 standard drinks of alcohol    Types: 3 Cans of beer per week    Comment: a week     Social History     Substance and Sexual Activity   Drug Use Yes    Types: Marijuana     Social History     Tobacco Use   Smoking Status Former    Current packs/day: 0.00    Average packs/day: 2.5 packs/day for 18.0 years (45.0 ttl pk-yrs)    Types: Cigarettes    Start date:     Quit date:     Years since quittin.3   Smokeless Tobacco Never     Family History   Problem Relation Age of Onset    Lung cancer Mother     Breast cancer additional onset Mother     Cancer Mother     Coronary artery disease Father     Cancer Sister     Lung cancer Sister        Meds/Allergies     (Not in a hospital admission)      Allergies   Allergen Reactions    Cat Hair Extract Sneezing    Dust Mite Extract Sneezing    Pollen Extract Sneezing     Seasonal allergies       Objective     Blood pressure (!) 181/112, pulse 90, temperature 97.6 °F (36.4 °C), temperature source Temporal, resp. rate 18, height 5' 8\" (1.727 m), " "weight 120 kg (264 lb 1.8 oz), SpO2 97%.      PHYSICAL EXAM    BP (!) 181/112   Pulse 90   Temp 97.6 °F (36.4 °C) (Temporal)   Resp 18   Ht 5' 8\" (1.727 m)   Wt 120 kg (264 lb 1.8 oz)   SpO2 97%   BMI 40.16 kg/m²       Gen: NAD  CV: RRR  CHEST: Clear  ABD: soft, NT/ND  EXT: no edema      ASSESSMENT/PLAN:  This is a 69 y.o. year old male here for endoscopy and colonoscopy, and he is stable and optimized for his procedure.        "

## 2024-05-02 NOTE — TELEPHONE ENCOUNTER
PA for Voquezna    Submitted via    [x]CMM-KEY [id number]  Waiting for next steps/questions to complete pa

## 2024-05-03 DIAGNOSIS — E78.2 MIXED HYPERLIPIDEMIA: Primary | ICD-10-CM

## 2024-05-03 NOTE — TELEPHONE ENCOUNTER
JUAN Allison Approved     Date(s) approved until 5/2/2025    Case #[id number]    Patient advised by          [x] MyChart Message  [] Phone call   []LMOM  []L/M to call office as no active Communication consent on file  []Unable to leave detailed message as VM not approved on Communication consent       Pharmacy advised by    [x]Fax  []Phone call    Approval letter NOT scanned into Media No letter revd at time of approval

## 2024-05-03 NOTE — TELEPHONE ENCOUNTER
PA for Voquezna    Submitted via    [x]CMM-KEY [id number]   []Surescripts-Case ID #   []Faxed to plan   []Other website   []Phone call Case ID #     Office notes sent, clinical questions answered. Awaiting determination    Turnaround time for your insurance to make a decision on your Prior Authorization can take 7-21 business days.

## 2024-05-06 RX ORDER — ATORVASTATIN CALCIUM 10 MG/1
10 TABLET, FILM COATED ORAL DAILY
Qty: 90 TABLET | Refills: 3 | Status: SHIPPED | OUTPATIENT
Start: 2024-05-06

## 2024-05-07 DIAGNOSIS — Z79.899 LONG-TERM CURRENT USE OF BENZODIAZEPINE: ICD-10-CM

## 2024-05-07 DIAGNOSIS — K22.70 BARRETT'S ESOPHAGUS WITHOUT DYSPLASIA: Primary | ICD-10-CM

## 2024-05-07 RX ORDER — ALPRAZOLAM 0.5 MG/1
0.5 TABLET ORAL 3 TIMES DAILY PRN
Qty: 30 TABLET | Refills: 0 | Status: SHIPPED | OUTPATIENT
Start: 2024-05-07

## 2024-05-07 RX ORDER — SUCRALFATE 1 G/1
1 TABLET ORAL 4 TIMES DAILY
Qty: 120 TABLET | Refills: 5 | Status: SHIPPED | OUTPATIENT
Start: 2024-05-07

## 2024-05-17 ENCOUNTER — RA CDI HCC (OUTPATIENT)
Dept: OTHER | Facility: HOSPITAL | Age: 69
End: 2024-05-17

## 2024-05-22 ENCOUNTER — TELEPHONE (OUTPATIENT)
Dept: ADMINISTRATIVE | Facility: OTHER | Age: 69
End: 2024-05-22

## 2024-05-22 NOTE — TELEPHONE ENCOUNTER
05/22/24 1:27 PM    Patient contacted to bring Advance Directive, POLST, or Living Will document to next scheduled pcp visit.VBI Department left message.    Thank you.  Vickie Gallagher  [facility]

## 2024-05-23 ENCOUNTER — NURSE TRIAGE (OUTPATIENT)
Age: 69
End: 2024-05-23

## 2024-05-23 ENCOUNTER — OFFICE VISIT (OUTPATIENT)
Dept: FAMILY MEDICINE CLINIC | Facility: CLINIC | Age: 69
End: 2024-05-23
Payer: MEDICARE

## 2024-05-23 VITALS
SYSTOLIC BLOOD PRESSURE: 132 MMHG | BODY MASS INDEX: 41.34 KG/M2 | HEIGHT: 68 IN | TEMPERATURE: 97.8 F | HEART RATE: 58 BPM | DIASTOLIC BLOOD PRESSURE: 82 MMHG | WEIGHT: 272.8 LBS | OXYGEN SATURATION: 97 %

## 2024-05-23 DIAGNOSIS — L85.3 DRY SKIN DERMATITIS: ICD-10-CM

## 2024-05-23 DIAGNOSIS — L60.8 TOENAIL DEFORMITY: Primary | ICD-10-CM

## 2024-05-23 DIAGNOSIS — J44.9 CHRONIC OBSTRUCTIVE PULMONARY DISEASE, UNSPECIFIED COPD TYPE (HCC): ICD-10-CM

## 2024-05-23 DIAGNOSIS — K22.70 BARRETT'S ESOPHAGUS WITHOUT DYSPLASIA: ICD-10-CM

## 2024-05-23 DIAGNOSIS — L98.9 SORE ON LEG: ICD-10-CM

## 2024-05-23 DIAGNOSIS — F41.9 ANXIETY DISORDER, UNSPECIFIED TYPE: ICD-10-CM

## 2024-05-23 DIAGNOSIS — Z79.899 BENZODIAZEPINE USE AGREEMENT EXISTS: ICD-10-CM

## 2024-05-23 PROBLEM — F13.930 BENZODIAZEPINE WITHDRAWAL WITHOUT COMPLICATION (HCC): Status: RESOLVED | Noted: 2024-02-14 | Resolved: 2024-05-23

## 2024-05-23 PROBLEM — H53.9 VISION DISTURBANCE: Status: RESOLVED | Noted: 2024-02-22 | Resolved: 2024-05-23

## 2024-05-23 PROBLEM — H53.40 UNSPECIFIED VISUAL FIELD DEFECTS: Status: RESOLVED | Noted: 2024-02-22 | Resolved: 2024-05-23

## 2024-05-23 PROCEDURE — 99214 OFFICE O/P EST MOD 30 MIN: CPT | Performed by: FAMILY MEDICINE

## 2024-05-23 PROCEDURE — G2211 COMPLEX E/M VISIT ADD ON: HCPCS | Performed by: FAMILY MEDICINE

## 2024-05-23 RX ORDER — TRIAMCINOLONE ACETONIDE 1 MG/G
CREAM TOPICAL 2 TIMES DAILY
Qty: 60 G | Refills: 0 | Status: SHIPPED | OUTPATIENT
Start: 2024-05-23

## 2024-05-23 RX ORDER — SUCRALFATE 1 G/1
1 TABLET ORAL 4 TIMES DAILY
Qty: 360 TABLET | Refills: 5 | Status: SHIPPED | OUTPATIENT
Start: 2024-05-23 | End: 2025-11-14

## 2024-05-23 RX ORDER — CICLOPIROX 80 MG/ML
SOLUTION TOPICAL
Qty: 6 ML | Refills: 2 | Status: SHIPPED | OUTPATIENT
Start: 2024-05-23

## 2024-05-23 NOTE — TELEPHONE ENCOUNTER
"LOV 03/07/24    Milena GENAO, with [employer], reports office notes were not needed for review approval of Vonoprazan Fumarate 20 mg however, because notes were submitted, they were reviewed. Diagnoses of GERD w/o esophagitis and Cottrell's esophagus w/o dysplasia are not indicated in the treatment with this medication per FDA therefore medication is not approved. Pt would need diagnosis of erosive esophagitis to be approved.      Reason for Disposition   Information only question and nurse able to answer    Answer Assessment - Initial Assessment Questions  1. REASON FOR CALL or QUESTION: \"What is your reason for calling today?\" or \"How can I best help you?\" or \"What question do you have that I can help answer?\"      Please see note    Protocols used: Information Only Call - No Triage-ADULT-[place]    "

## 2024-05-23 NOTE — PROGRESS NOTES
Assessment/Plan:         Problem List Items Addressed This Visit        Respiratory    Chronic obstructive pulmonary disease, unspecified COPD type (HCC)     Has Ventolin to use PRN            Digestive    Cottrell's esophagus     Had EGD and is on Protonix, Sucralfate         Relevant Medications    sucralfate (CARAFATE) 1 g tablet       Musculoskeletal and Integument    Toenail deformity - Primary     Will try topical treatment and refer to Podiatry          Relevant Medications    ciclopirox (PENLAC) 8 % solution    triamcinolone (KENALOG) 0.1 % cream    Other Relevant Orders    Ambulatory referral to Podiatry    Dry skin dermatitis     Use triamcinolone BID and apply moisturizer.          Relevant Medications    ciclopirox (PENLAC) 8 % solution    triamcinolone (KENALOG) 0.1 % cream       Behavioral Health    Anxiety disorder, unspecified     Updated benzo agreement, taking Xanax at bedtime, sertraline during the day            Other    Benzodiazepine use agreement exists     Agreement updated          Sore on leg     Advised moisturizer twice daily, avoid scratching, if worsening to call back              Subjective:      Patient ID: Simeon Giron is a 69 y.o. male.    Pt here for check up. He has thickened toenails, has difficulty cutting them. Thinks he has fungal infection of one of the toenails. He also has a rash on his leg - comes and goes, somewhat itchy.  No treatment  He has Cottrell's esophagus, recently had EGD with Dr. Wyatt. Was prescribed a new med called Vonoprazen but it was too expensive so pt could not take it. Is on PPI and Carafate  Anxiety - stable on Sertraline and taking Xanax for sleep         The following portions of the patient's history were reviewed and updated as appropriate:   Past Medical History:  He has a past medical history of Atrial fibrillation (HCC), GERD (gastroesophageal reflux disease), Hyperlipidemia, Hypertension, Irritable bowel syndrome, Obesity, and Vocal cord  dysfunction.,  _______________________________________________________________________  Medical Problems:  does not have any pertinent problems on file.,  _______________________________________________________________________  Past Surgical History:   has a past surgical history that includes Cardiac electrophysiology mapping and ablation; Ankle arthroplasty; Laparoscopic gastric banding; Replacement total knee (Left); Cataract extraction (Bilateral); and Carpal tunnel release.,  _______________________________________________________________________  Family History:  family history includes Breast cancer additional onset in his mother; Cancer in his mother and sister; Coronary artery disease in his father; Lung cancer in his mother and sister.,  _______________________________________________________________________  Social History:   reports that he quit smoking about 36 years ago. His smoking use included cigarettes. He started smoking about 54 years ago. He has a 45 pack-year smoking history. He has never used smokeless tobacco. He reports current alcohol use of about 3.0 standard drinks of alcohol per week. He reports current drug use. Drug: Marijuana.,  _______________________________________________________________________  Allergies:  is allergic to cat hair extract, dust mite extract, and pollen extract..  _______________________________________________________________________  Current Outpatient Medications   Medication Sig Dispense Refill   • albuterol (PROVENTIL HFA,VENTOLIN HFA) 90 mcg/act inhaler Inhale 2 puffs     • ALPRAZolam (XANAX) 0.5 mg tablet Take 1 tablet (0.5 mg total) by mouth 3 (three) times a day as needed for anxiety 30 tablet 0   • atorvastatin (LIPITOR) 10 mg tablet Take 1 tablet (10 mg total) by mouth daily 90 tablet 3   • cetirizine (ZyrTEC) 10 mg tablet Take 10 mg by mouth daily     • ciclopirox (PENLAC) 8 % solution Apply topically daily at bedtime 6 mL 2   • dicyclomine (BENTYL) 10  "mg capsule Take 1 capsule (10 mg total) by mouth every 6 (six) hours as needed (abdominal cramping) 90 capsule 1   • erythromycin (ILOTYCIN) ophthalmic ointment APPLY A THIN RIBBON OF THE OINTMENT TO THE INSIDE OF THE EYELID OF THE LOWER EYELID IN BOTH EYES AT BEDTIME     • gabapentin (NEURONTIN) 300 mg capsule Take 300 mg by mouth 3 (three) times a day     • metoclopramide (REGLAN) 10 mg tablet Take 1 tablet (10 mg total) by mouth 4 (four) times a day 30 tablet 0   • metoprolol succinate (TOPROL-XL) 50 mg 24 hr tablet Take 1 tablet (50 mg total) by mouth daily 90 tablet 1   • pantoprazole (PROTONIX) 40 mg tablet Take 1 tablet (40 mg total) by mouth 2 (two) times a day 60 tablet 11   • Pradaxa 150 MG capsu Take 1 capsule (150 mg total) by mouth 2 (two) times a day 180 capsule 0   • sertraline (ZOLOFT) 100 mg tablet Take 1 tablet (100 mg total) by mouth daily 90 tablet 1   • Spacer/Aero-Holding Chambers NUNO Use with inhaler.     • sucralfate (CARAFATE) 1 g tablet Take 1 tablet (1 g total) by mouth 4 (four) times a day 360 tablet 5   • triamcinolone (KENALOG) 0.1 % cream Apply topically 2 (two) times a day Apply to dry skin on legs 60 g 0     No current facility-administered medications for this visit.     _______________________________________________________________________  Review of Systems   Constitutional:  Negative for activity change, appetite change, fatigue and unexpected weight change.   Respiratory:  Negative for chest tightness and shortness of breath.    Cardiovascular:  Negative for chest pain and leg swelling.   Gastrointestinal:  Negative for abdominal pain.   Skin:  Positive for rash.        Toenail discoloration    Neurological:  Negative for headaches.         Objective:  Vitals:    05/23/24 1356   BP: 132/82   Pulse: 58   Temp: 97.8 °F (36.6 °C)   SpO2: 97%   Weight: 124 kg (272 lb 12.8 oz)   Height: 5' 8\" (1.727 m)     Body mass index is 41.48 kg/m².     Physical Exam  Vitals and nursing note " reviewed.   Constitutional:       General: He is not in acute distress.     Appearance: Normal appearance. He is well-developed. He is obese. He is not diaphoretic.   HENT:      Head: Normocephalic and atraumatic.   Cardiovascular:      Rate and Rhythm: Normal rate and regular rhythm.      Heart sounds: Normal heart sounds. No murmur heard.     No friction rub. No gallop.   Pulmonary:      Effort: Pulmonary effort is normal. No respiratory distress.      Breath sounds: Normal breath sounds. No wheezing or rales.   Chest:      Chest wall: No tenderness.   Musculoskeletal:         General: No swelling.      Right lower leg: No edema.      Left lower leg: No edema.   Skin:     General: Skin is dry.          Neurological:      Mental Status: He is alert and oriented to person, place, and time.   Psychiatric:         Mood and Affect: Mood normal.         Behavior: Behavior normal.         Thought Content: Thought content normal.         Judgment: Judgment normal.

## 2024-05-24 ENCOUNTER — TELEPHONE (OUTPATIENT)
Age: 69
End: 2024-05-24

## 2024-05-24 DIAGNOSIS — K22.10 EROSIVE ESOPHAGITIS: Primary | ICD-10-CM

## 2024-05-24 DIAGNOSIS — K22.10 EROSIVE ESOPHAGITIS: ICD-10-CM

## 2024-05-24 DIAGNOSIS — K22.70 BARRETT'S ESOPHAGUS WITHOUT DYSPLASIA: Primary | ICD-10-CM

## 2024-05-24 NOTE — LETTER
[facility] GASTROENTEROLOGY POD  [address]  [place] [address]  [phone]  Dept: [phone]    May 24, 2024     Patient: Simeon Giron   YOB: 1955   Date of Visit:        To Whom it May Concern:    Simeon Giron is under my professional care. Please consider this as an appeal for the medication   Vonoprazan Fumarate 20 MG TABS  to be taken twice a day.  This patient has indications of   Erosive esophagitis [K22.10] and Cottrell's esophagus without dysplasia [K22.70]  as determined by EGD and biopsies. This patient has tried and failed many high dosage PPIs.  He has longstanding reflux for which he takes Protonix 40 mg twice daily and he is on Reglan as needed.  It is imperative for this patient to receive this medication.  Please fax new determination to [phone].  Thank you    If you have any questions or concerns, please don't hesitate to call.         Sincerely,        Abilio Wyatt III, MD  [address] [facility] [address] [address]  Phone: [phone]   Fax: [phone]  CONSTANTINO #: [id number]   NPI: [id number]               Tissue Exam: [id number]  Order: [id number]   Collected 5/2/2024  1:10 PM       Status: Final result       Visible to patient: Yes (seen)    1 Result Note       1 Patient Communication     Component   Case Report  Surgical Pathology Report                         Case: [id number]                                  Authorizing Provider:  Abilio Wyatt III, MD   Collected:           05/02/2024 1310              Ordering Location:      [facility]       Received:            05/02/2024 [address] [place] [address] Endoscopy                                                            Pathologist:           Jim Valerio MD                                                          Specimen:    Esophagus, distal esophagus                                                              Final Diagnosis  A. Gastroesophageal junction,  "\"Distal esophagus,\" Biopsy:  - Fragments of gastroesophageal junction mucosa with moderate chronic inflammation and focal acute esopahgitis  - Negative for intestinal metaplasia  - Squamous mucosa with reactive changes, including acanthosis and basal cell hyperplasia        Electronically signed by Jim Valerio MD on 5/6/2024 at 10:44 AM  Additional Information   All reported additional testing was performed with appropriately reactive controls.  These tests were developed and their performance characteristics determined by [facility] or appropriate performing facility, though some tests may be performed on tissues which have not been validated for performance characteristics (such as staining performed on alcohol exposed cell blocks and decalcified tissues).  Results should be interpreted with caution and in the context of the patients’ clinical condition. These tests may not be cleared or approved by the U.S. Food and Drug Administration, though the FDA has determined that such clearance or approval is not necessary. These tests are used for clinical purposes and they should not be regarded as investigational or for research. This laboratory has been approved by [facility], designated as a high-complexity laboratory and is qualified to perform these tests.  .Interpretation performed at [facility], [address] [place] [address]     Gross Description   A. The specimen is received in formalin, labeled with the patient's name and hospital number, and is designated \" distal esophagus\".  The specimen consists of 4 tan soft tissue fragments measuring range from 0.2-0.4 cm in greatest dimension.  Entirely submitted. One screened cassette.     Note: The estimated total formalin fixation time based upon information provided by the submitting clinician and the standard processing schedule is under 72 hours.        ROSEYelaarely  Clinical Information   Cold bx r/o Barretts    FINDINGS:  · The upper " third of the esophagus, middle third of the esophagus and lower third of the esophagus appeared normal. Z-line is 38 cm from the incisors.  · Irregular Z-line; performed cold forceps biopsy  · Grade A esophagitis with mucosal breaks measuring less than 5 mm not continuous between folds, covering less than 75% of the circumference in the GE junction  · The fundus of the stomach, body of the stomach, antrum, duodenal bulb, 1st part of the duodenum and 2nd part of the duodenum appeared normal.  Resulting Agency [facility]          Specimen Collected: 05/02/24  1:10 PM Last Resulted: 05/06/24 10:44 AM            [facility]  [address] [facility] [address]  [phone]        DATE OF SERVICE:  5/02/24     PHYSICIAN(S):  Attending:   Abilio Wyatt III, MD     Fellow:   No Staff Documented        INDICATION:  Hoarse voice quality, Gastroesophageal reflux disease without esophagitis, Cottrell's esophagus without dysplasia     POST-OP DIAGNOSIS:  See the impression below.     PREPROCEDURE:  Informed consent was obtained for the procedure, including sedation.  Risks of perforation, hemorrhage, adverse drug reaction and aspiration were discussed. The patient was placed in the left lateral decubitus position.     Patient was explained about the risks and benefits of the procedure. Risks including but not limited to bleeding, infection, and perforation were explained in detail. Also explained about less than 100% sensitivity with the exam and other alternatives.     PROCEDURE: EGD     DETAILS OF PROCEDURE:   Patient was taken to the procedure room where a time out was performed to confirm correct patient and correct procedure. The patient underwent monitored anesthesia care, which was administered by an anesthesia professional. The patient's blood pressure, heart rate, level of consciousness, respirations, oxygen, ECG and ETCO2 were monitored throughout the procedure. The scope was  introduced through the mouth and advanced to the second part of the duodenum. Retroflexion was performed in the fundus. The patient experienced no blood loss. The procedure was not difficult. The patient tolerated the procedure well. There were no apparent adverse events.      ANESTHESIA INFORMATION:  ASA: III  Anesthesia Type: IV Sedation with Anesthesia     MEDICATIONS:  No administrations occurring from 1253 to 1308 on 05/02/24        FINDINGS:  · The upper third of the esophagus, middle third of the esophagus and lower third of the esophagus appeared normal. Z-line is 38 cm from the incisors.  · Irregular Z-line; performed cold forceps biopsy  · Grade A esophagitis with mucosal breaks measuring less than 5 mm not continuous between folds, covering less than 75% of the circumference in the GE junction  · The fundus of the stomach, body of the stomach, antrum, duodenal bulb, 1st part of the duodenum and 2nd part of the duodenum appeared normal.        SPECIMENS:  * No specimens in log *        IMPRESSION:  LA grade a reflux esophagitis  Irregular Z-line        RECOMMENDATION:    Await pathology results     I will prescribe Vonoprazan for refractory GERD and esophagitis given his multiple medication failures  Protonix and Reglan when the new medicine is approved  Reflux precautions  Await pathology             Abilio Wyatt III, MD

## 2024-05-24 NOTE — TELEPHONE ENCOUNTER
Hi team, I sent in a new prescription with different coding as Dr. Wyatt put erosive esophagitis.  The patient does have history of Cottrell's, therefore I put it as a diagnosis now.  Lets see if it will be approved now?

## 2024-05-24 NOTE — TELEPHONE ENCOUNTER
Routing to [place]    Patients insurance denied the medication VOQUEZNA.... is there a alternative ? Please advise. Thank you !

## 2024-05-24 NOTE — TELEPHONE ENCOUNTER
Called patient and got VM... LMOM that as per Dr Wyatt the [facility]....... left office # as well

## 2024-05-24 NOTE — TELEPHONE ENCOUNTER
An appeal must be done.  The patient will not be approved for Cottrell's esophagus, he needs the diagnosis of Erosive esophagitis and documents of this clinically.      [place] for Voquezna appealed via     []CMM  []SS  [x]Letter sent to insurance via fax   []Other site or means     All necessary records sent. Will await response from insurance company    Turnaround time for a decision to be made on an appeal could take up to 30 business days

## 2024-05-28 NOTE — TELEPHONE ENCOUNTER
JUAN Allison Approved     Date(s) approved until 5/28/2025    Case #[id number]    Patient advised by          [x] Calvinhart Message  [] Phone call   []LMOM  []L/M to call office as no active Communication consent on file  []Unable to leave detailed message as VM not approved on Communication consent       Pharmacy advised by    [x]Fax  []Phone call    Approval letter scanned into Media Yes

## 2024-05-28 NOTE — TELEPHONE ENCOUNTER
Rcvd fax of from with additional questions for Voquezna.  Filled out and faxed to   Scanned in media

## 2024-05-31 ENCOUNTER — HOSPITAL ENCOUNTER (OUTPATIENT)
Dept: PULMONOLOGY | Facility: HOSPITAL | Age: 69
End: 2024-05-31
Payer: MEDICARE

## 2024-05-31 DIAGNOSIS — J44.9 CHRONIC OBSTRUCTIVE PULMONARY DISEASE, UNSPECIFIED COPD TYPE (HCC): ICD-10-CM

## 2024-05-31 PROCEDURE — 94726 PLETHYSMOGRAPHY LUNG VOLUMES: CPT | Performed by: INTERNAL MEDICINE

## 2024-05-31 PROCEDURE — 94760 N-INVAS EAR/PLS OXIMETRY 1: CPT

## 2024-05-31 PROCEDURE — 94060 EVALUATION OF WHEEZING: CPT

## 2024-05-31 PROCEDURE — 94726 PLETHYSMOGRAPHY LUNG VOLUMES: CPT

## 2024-05-31 PROCEDURE — 94729 DIFFUSING CAPACITY: CPT

## 2024-05-31 PROCEDURE — 94060 EVALUATION OF WHEEZING: CPT | Performed by: INTERNAL MEDICINE

## 2024-05-31 PROCEDURE — 94729 DIFFUSING CAPACITY: CPT | Performed by: INTERNAL MEDICINE

## 2024-05-31 RX ORDER — ALBUTEROL SULFATE 2.5 MG/3ML
2.5 SOLUTION RESPIRATORY (INHALATION) ONCE
Status: COMPLETED | OUTPATIENT
Start: 2024-05-31 | End: 2024-05-31

## 2024-05-31 RX ADMIN — ALBUTEROL SULFATE 2.5 MG: 2.5 SOLUTION RESPIRATORY (INHALATION) at 13:53

## 2024-06-13 DIAGNOSIS — Z79.899 LONG-TERM CURRENT USE OF BENZODIAZEPINE: ICD-10-CM

## 2024-06-14 RX ORDER — ALPRAZOLAM 0.5 MG/1
0.5 TABLET ORAL 3 TIMES DAILY PRN
Qty: 30 TABLET | Refills: 0 | Status: SHIPPED | OUTPATIENT
Start: 2024-06-14

## 2024-06-19 DIAGNOSIS — K22.70 BARRETT'S ESOPHAGUS WITHOUT DYSPLASIA: ICD-10-CM

## 2024-06-19 RX ORDER — SUCRALFATE 1 G/1
1 TABLET ORAL 4 TIMES DAILY
Qty: 360 TABLET | Refills: 1 | Status: SHIPPED | OUTPATIENT
Start: 2024-06-19 | End: 2025-12-11

## 2024-07-09 DIAGNOSIS — Z91.09 ENVIRONMENTAL ALLERGIES: Primary | ICD-10-CM

## 2024-07-09 DIAGNOSIS — Z79.899 LONG-TERM CURRENT USE OF BENZODIAZEPINE: ICD-10-CM

## 2024-07-09 DIAGNOSIS — I48.0 PAF (PAROXYSMAL ATRIAL FIBRILLATION) (HCC): ICD-10-CM

## 2024-07-10 RX ORDER — CETIRIZINE HYDROCHLORIDE 10 MG/1
10 TABLET ORAL DAILY
Qty: 100 TABLET | Refills: 3 | Status: SHIPPED | OUTPATIENT
Start: 2024-07-10

## 2024-07-10 RX ORDER — ALPRAZOLAM 0.5 MG/1
0.5 TABLET ORAL 3 TIMES DAILY PRN
Qty: 30 TABLET | Refills: 0 | Status: SHIPPED | OUTPATIENT
Start: 2024-07-10

## 2024-07-10 RX ORDER — ATENOLOL 100 MG/1
150 TABLET ORAL 2 TIMES DAILY
Qty: 180 CAPSULE | Refills: 0 | Status: SHIPPED | OUTPATIENT
Start: 2024-07-10

## 2024-07-18 DIAGNOSIS — K21.9 GASTROESOPHAGEAL REFLUX DISEASE WITHOUT ESOPHAGITIS: ICD-10-CM

## 2024-07-18 RX ORDER — PANTOPRAZOLE SODIUM 40 MG/1
40 TABLET, DELAYED RELEASE ORAL 2 TIMES DAILY
Qty: 60 TABLET | Refills: 5 | Status: SHIPPED | OUTPATIENT
Start: 2024-07-18

## 2024-07-24 NOTE — TELEPHONE ENCOUNTER
Patient called for a refill of Metoprolol. I informed him he dafned still have a refill at [facility]. Patient will contact [facility] now.

## 2024-08-14 DIAGNOSIS — Z79.899 LONG-TERM CURRENT USE OF BENZODIAZEPINE: ICD-10-CM

## 2024-08-15 DIAGNOSIS — F41.9 ANXIETY DISORDER, UNSPECIFIED TYPE: ICD-10-CM

## 2024-08-15 DIAGNOSIS — R11.0 NAUSEA: ICD-10-CM

## 2024-08-15 DIAGNOSIS — Z79.899 LONG-TERM CURRENT USE OF BENZODIAZEPINE: ICD-10-CM

## 2024-08-15 DIAGNOSIS — E78.2 MIXED HYPERLIPIDEMIA: ICD-10-CM

## 2024-08-15 DIAGNOSIS — I48.0 PAF (PAROXYSMAL ATRIAL FIBRILLATION) (HCC): ICD-10-CM

## 2024-08-15 DIAGNOSIS — F32.A MILD DEPRESSION: ICD-10-CM

## 2024-08-15 RX ORDER — METOCLOPRAMIDE 10 MG/1
10 TABLET ORAL 4 TIMES DAILY
Qty: 30 TABLET | Refills: 0 | Status: SHIPPED | OUTPATIENT
Start: 2024-08-15

## 2024-08-15 RX ORDER — ALPRAZOLAM 0.5 MG
0.5 TABLET ORAL 3 TIMES DAILY PRN
Qty: 30 TABLET | Refills: 0 | Status: SHIPPED | OUTPATIENT
Start: 2024-08-15 | End: 2024-08-19 | Stop reason: SDUPTHER

## 2024-08-15 RX ORDER — ALPRAZOLAM 0.5 MG
0.5 TABLET ORAL 3 TIMES DAILY PRN
Qty: 30 TABLET | Refills: 0 | Status: SHIPPED | OUTPATIENT
Start: 2024-08-15 | End: 2024-08-15 | Stop reason: SDUPTHER

## 2024-08-15 RX ORDER — METOPROLOL SUCCINATE 50 MG/1
50 TABLET, EXTENDED RELEASE ORAL DAILY
Qty: 90 TABLET | Refills: 1 | Status: SHIPPED | OUTPATIENT
Start: 2024-08-15

## 2024-08-15 RX ORDER — ATORVASTATIN CALCIUM 10 MG/1
10 TABLET, FILM COATED ORAL DAILY
Qty: 90 TABLET | Refills: 3 | Status: SHIPPED | OUTPATIENT
Start: 2024-08-15

## 2024-08-15 RX ORDER — SERTRALINE HYDROCHLORIDE 100 MG/1
100 TABLET, FILM COATED ORAL DAILY
Qty: 90 TABLET | Refills: 1 | Status: SHIPPED | OUTPATIENT
Start: 2024-08-15

## 2024-08-19 ENCOUNTER — TELEPHONE (OUTPATIENT)
Age: 69
End: 2024-08-19

## 2024-08-19 DIAGNOSIS — Z79.899 LONG-TERM CURRENT USE OF BENZODIAZEPINE: ICD-10-CM

## 2024-08-19 RX ORDER — ALPRAZOLAM 0.5 MG
0.5 TABLET ORAL 3 TIMES DAILY PRN
Qty: 30 TABLET | Refills: 0 | Status: SHIPPED | OUTPATIENT
Start: 2024-08-19 | End: 2024-08-20 | Stop reason: SDUPTHER

## 2024-08-19 NOTE — TELEPHONE ENCOUNTER
Patient said  script was canceled at [facility] when he went to pick it up. The patient said is going through withdrawals and needs script to be sent to [facility] [address] [place] [address]   He is having depression with the withdrawals   The following medication renewals have been approved and sent to the pharmacy:     The following prescriptions will be filled at [facility] [address] [place] [address], [place] - [address] [[phone]]:   - ALPRAZolam (XANAX) 0.5 mg tablet    Please advise daughter if can be done

## 2024-08-19 NOTE — TELEPHONE ENCOUNTER
Patient will be picking up this prescription this afternoon.  Please send in full prescription to [facility] by tomorrow morning so that the one sent in to [facility] does not get cancelled.  Thank you.

## 2024-08-20 DIAGNOSIS — Z79.899 LONG-TERM CURRENT USE OF BENZODIAZEPINE: ICD-10-CM

## 2024-08-20 RX ORDER — ALPRAZOLAM 0.5 MG
0.5 TABLET ORAL 3 TIMES DAILY PRN
Qty: 90 TABLET | Refills: 0 | Status: SHIPPED | OUTPATIENT
Start: 2024-08-20

## 2024-08-21 ENCOUNTER — TELEPHONE (OUTPATIENT)
Dept: FAMILY MEDICINE CLINIC | Facility: CLINIC | Age: 69
End: 2024-08-21

## 2024-08-21 DIAGNOSIS — G62.9 PERIPHERAL POLYNEUROPATHY: Primary | ICD-10-CM

## 2024-08-21 RX ORDER — GABAPENTIN 300 MG/1
300 CAPSULE ORAL 3 TIMES DAILY
Qty: 270 CAPSULE | Refills: 1 | Status: SHIPPED | OUTPATIENT
Start: 2024-08-21

## 2024-08-22 ENCOUNTER — TELEPHONE (OUTPATIENT)
Age: 69
End: 2024-08-22

## 2024-08-22 NOTE — TELEPHONE ENCOUNTER
Pt is scheduled for a dental appointment tomorrow but was told he needed to check with pcp if he should hold the pradaxa and for how long?      Dentist office also asking for directions be emailed to them as well to [email or web address]    Please advise

## 2024-09-03 ENCOUNTER — TELEPHONE (OUTPATIENT)
Dept: GASTROENTEROLOGY | Facility: CLINIC | Age: 69
End: 2024-09-03

## 2024-09-03 DIAGNOSIS — R11.0 NAUSEA: ICD-10-CM

## 2024-09-04 RX ORDER — METOCLOPRAMIDE 10 MG/1
10 TABLET ORAL 4 TIMES DAILY
Qty: 360 TABLET | Refills: 1 | Status: SHIPPED | OUTPATIENT
Start: 2024-09-04

## 2024-09-16 ENCOUNTER — TELEPHONE (OUTPATIENT)
Dept: FAMILY MEDICINE CLINIC | Facility: CLINIC | Age: 69
End: 2024-09-16

## 2024-09-16 DIAGNOSIS — R10.9 FLANK PAIN: Primary | ICD-10-CM

## 2024-09-16 NOTE — TELEPHONE ENCOUNTER
Daughter states he is having some right side back kidney area pain. He did not lift heavy or pull a muscle. Has no difficulty urinating, blood in urine or anyother sx. He did do a 4 hr car ride but since his wifes issues before passing away started like this he is very anxious. You didn't have any openings anytime soon so family is asking if you can order labs or something for pt.   Please advise /call pt

## 2024-09-17 ENCOUNTER — APPOINTMENT (OUTPATIENT)
Dept: LAB | Facility: CLINIC | Age: 69
End: 2024-09-17
Payer: MEDICARE

## 2024-09-17 DIAGNOSIS — R49.0 DYSPHONIA: ICD-10-CM

## 2024-09-17 DIAGNOSIS — R09.A2 GLOBUS SENSATION: ICD-10-CM

## 2024-09-17 DIAGNOSIS — Z13.29 THYROID DISORDER SCREEN: ICD-10-CM

## 2024-09-17 DIAGNOSIS — R76.8 HIGH TOTAL SERUM IGA: ICD-10-CM

## 2024-09-17 DIAGNOSIS — J38.01 PARESIS OF RIGHT VOCAL FOLD: ICD-10-CM

## 2024-09-17 DIAGNOSIS — Z91.89 RISK OF EXPOSURE TO LYME DISEASE: ICD-10-CM

## 2024-09-17 DIAGNOSIS — K21.00 GASTROESOPHAGEAL REFLUX DISEASE WITH ESOPHAGITIS WITHOUT HEMORRHAGE: ICD-10-CM

## 2024-09-17 DIAGNOSIS — Z86.19 HX OF LYME DISEASE: ICD-10-CM

## 2024-09-17 DIAGNOSIS — R79.89 LOW SERUM VITAMIN A: ICD-10-CM

## 2024-09-17 DIAGNOSIS — R10.9 FLANK PAIN: ICD-10-CM

## 2024-09-17 DIAGNOSIS — R49.0 MUSCLE TENSION DYSPHONIA: ICD-10-CM

## 2024-09-17 LAB
ALBUMIN SERPL BCG-MCNC: 3.8 G/DL (ref 3.5–5)
ALP SERPL-CCNC: 68 U/L (ref 34–104)
ALT SERPL W P-5'-P-CCNC: 10 U/L (ref 7–52)
ANA SER QL IA: NEGATIVE
ANION GAP SERPL CALCULATED.3IONS-SCNC: 7 MMOL/L (ref 4–13)
AST SERPL W P-5'-P-CCNC: 16 U/L (ref 13–39)
BACTERIA UR QL AUTO: ABNORMAL /HPF
BASOPHILS # BLD AUTO: 0.1 THOUSANDS/ΜL (ref 0–0.1)
BASOPHILS NFR BLD AUTO: 1 % (ref 0–1)
BILIRUB SERPL-MCNC: 0.49 MG/DL (ref 0.2–1)
BILIRUB UR QL STRIP: NEGATIVE
BUN SERPL-MCNC: 11 MG/DL (ref 5–25)
CALCIUM SERPL-MCNC: 8.5 MG/DL (ref 8.4–10.2)
CHLORIDE SERPL-SCNC: 101 MMOL/L (ref 96–108)
CLARITY UR: CLEAR
CO2 SERPL-SCNC: 28 MMOL/L (ref 21–32)
COLOR UR: YELLOW
CREAT SERPL-MCNC: 0.7 MG/DL (ref 0.6–1.3)
EOSINOPHIL # BLD AUTO: 0.49 THOUSAND/ΜL (ref 0–0.61)
EOSINOPHIL NFR BLD AUTO: 7 % (ref 0–6)
ERYTHROCYTE [DISTWIDTH] IN BLOOD BY AUTOMATED COUNT: 13.1 % (ref 11.6–15.1)
GFR SERPL CREATININE-BSD FRML MDRD: 95 ML/MIN/1.73SQ M
GLUCOSE SERPL-MCNC: 87 MG/DL (ref 65–140)
GLUCOSE UR STRIP-MCNC: NEGATIVE MG/DL
HCT VFR BLD AUTO: 39.7 % (ref 36.5–49.3)
HGB BLD-MCNC: 13.1 G/DL (ref 12–17)
HGB UR QL STRIP.AUTO: ABNORMAL
IGA SERPL-MCNC: 428 MG/DL (ref 66–433)
IGG SERPL-MCNC: 999 MG/DL (ref 635–1741)
IGM SERPL-MCNC: <20 MG/DL (ref 45–281)
IMM GRANULOCYTES # BLD AUTO: 0.02 THOUSAND/UL (ref 0–0.2)
IMM GRANULOCYTES NFR BLD AUTO: 0 % (ref 0–2)
KETONES UR STRIP-MCNC: NEGATIVE MG/DL
LEUKOCYTE ESTERASE UR QL STRIP: NEGATIVE
LYMPHOCYTES # BLD AUTO: 1.93 THOUSANDS/ΜL (ref 0.6–4.47)
LYMPHOCYTES NFR BLD AUTO: 28 % (ref 14–44)
MCH RBC QN AUTO: 29.9 PG (ref 26.8–34.3)
MCHC RBC AUTO-ENTMCNC: 33 G/DL (ref 31.4–37.4)
MCV RBC AUTO: 91 FL (ref 82–98)
MONOCYTES # BLD AUTO: 0.59 THOUSAND/ΜL (ref 0.17–1.22)
MONOCYTES NFR BLD AUTO: 9 % (ref 4–12)
MUCOUS THREADS UR QL AUTO: ABNORMAL
NEUTROPHILS # BLD AUTO: 3.84 THOUSANDS/ΜL (ref 1.85–7.62)
NEUTS SEG NFR BLD AUTO: 55 % (ref 43–75)
NITRITE UR QL STRIP: NEGATIVE
NON-SQ EPI CELLS URNS QL MICRO: ABNORMAL /HPF
NRBC BLD AUTO-RTO: 0 /100 WBCS
PH UR STRIP.AUTO: 6 [PH]
PLATELET # BLD AUTO: 252 THOUSANDS/UL (ref 149–390)
PMV BLD AUTO: 9.4 FL (ref 8.9–12.7)
POTASSIUM SERPL-SCNC: 4.2 MMOL/L (ref 3.5–5.3)
PROT SERPL-MCNC: 6.9 G/DL (ref 6.4–8.4)
PROT UR STRIP-MCNC: NEGATIVE MG/DL
RBC # BLD AUTO: 4.38 MILLION/UL (ref 3.88–5.62)
RBC #/AREA URNS AUTO: ABNORMAL /HPF
SODIUM SERPL-SCNC: 136 MMOL/L (ref 135–147)
SP GR UR STRIP.AUTO: 1.02 (ref 1–1.03)
TSH SERPL DL<=0.05 MIU/L-ACNC: 1.52 UIU/ML (ref 0.45–4.5)
UROBILINOGEN UR STRIP-ACNC: <2 MG/DL
WBC # BLD AUTO: 6.97 THOUSAND/UL (ref 4.31–10.16)
WBC #/AREA URNS AUTO: ABNORMAL /HPF

## 2024-09-17 PROCEDURE — 86042 ACETYLCHOLN RCPTR BLCKG ANTB: CPT

## 2024-09-17 PROCEDURE — 86258 DGP ANTIBODY EACH IG CLASS: CPT

## 2024-09-17 PROCEDURE — 85025 COMPLETE CBC W/AUTO DIFF WBC: CPT

## 2024-09-17 PROCEDURE — 86618 LYME DISEASE ANTIBODY: CPT

## 2024-09-17 PROCEDURE — 82784 ASSAY IGA/IGD/IGG/IGM EACH: CPT

## 2024-09-17 PROCEDURE — 84443 ASSAY THYROID STIM HORMONE: CPT

## 2024-09-17 PROCEDURE — 81001 URINALYSIS AUTO W/SCOPE: CPT

## 2024-09-17 PROCEDURE — 86038 ANTINUCLEAR ANTIBODIES: CPT

## 2024-09-17 PROCEDURE — 86617 LYME DISEASE ANTIBODY: CPT

## 2024-09-17 PROCEDURE — 36415 COLL VENOUS BLD VENIPUNCTURE: CPT

## 2024-09-17 PROCEDURE — 80053 COMPREHEN METABOLIC PANEL: CPT

## 2024-09-17 PROCEDURE — 86003 ALLG SPEC IGE CRUDE XTRC EA: CPT

## 2024-09-17 PROCEDURE — 86043 ACETYLCHOLN RCPTR MODLG ANTB: CPT

## 2024-09-17 PROCEDURE — 86364 TISS TRNSGLTMNASE EA IG CLAS: CPT

## 2024-09-17 PROCEDURE — 84590 ASSAY OF VITAMIN A: CPT

## 2024-09-17 PROCEDURE — 86041 ACETYLCHOLN RCPTR BNDNG ANTB: CPT

## 2024-09-18 ENCOUNTER — OFFICE VISIT (OUTPATIENT)
Dept: FAMILY MEDICINE CLINIC | Facility: CLINIC | Age: 69
End: 2024-09-18
Payer: MEDICARE

## 2024-09-18 VITALS
WEIGHT: 279 LBS | OXYGEN SATURATION: 100 % | BODY MASS INDEX: 42.28 KG/M2 | DIASTOLIC BLOOD PRESSURE: 78 MMHG | HEART RATE: 69 BPM | SYSTOLIC BLOOD PRESSURE: 126 MMHG | HEIGHT: 68 IN | TEMPERATURE: 97.9 F

## 2024-09-18 DIAGNOSIS — F41.9 ANXIETY DISORDER, UNSPECIFIED TYPE: ICD-10-CM

## 2024-09-18 DIAGNOSIS — R10.9 LEFT FLANK PAIN: Primary | ICD-10-CM

## 2024-09-18 DIAGNOSIS — Z79.899 LONG-TERM CURRENT USE OF BENZODIAZEPINE: ICD-10-CM

## 2024-09-18 DIAGNOSIS — F32.A MILD DEPRESSION: ICD-10-CM

## 2024-09-18 DIAGNOSIS — R31.29 OTHER MICROSCOPIC HEMATURIA: ICD-10-CM

## 2024-09-18 LAB
B BURGDOR IGG SERPL QL IA: POSITIVE
B BURGDOR IGG+IGM SER QL IA: POSITIVE
B BURGDOR IGM SERPL QL IA: NEGATIVE

## 2024-09-18 PROCEDURE — G2211 COMPLEX E/M VISIT ADD ON: HCPCS | Performed by: FAMILY MEDICINE

## 2024-09-18 PROCEDURE — 99214 OFFICE O/P EST MOD 30 MIN: CPT | Performed by: FAMILY MEDICINE

## 2024-09-18 PROCEDURE — 87086 URINE CULTURE/COLONY COUNT: CPT | Performed by: FAMILY MEDICINE

## 2024-09-18 RX ORDER — ALPRAZOLAM 0.5 MG
0.5 TABLET ORAL 3 TIMES DAILY PRN
Qty: 90 TABLET | Refills: 0 | Status: CANCELLED | OUTPATIENT
Start: 2024-09-18

## 2024-09-18 RX ORDER — SERTRALINE HYDROCHLORIDE 100 MG/1
100 TABLET, FILM COATED ORAL DAILY
Qty: 90 TABLET | Refills: 3 | Status: SHIPPED | OUTPATIENT
Start: 2024-09-18

## 2024-09-18 NOTE — ASSESSMENT & PLAN NOTE
Refilled Sertraline.    Orders:    sertraline (ZOLOFT) 100 mg tablet; Take 1 tablet (100 mg total) by mouth daily

## 2024-09-18 NOTE — PROGRESS NOTES
"Ambulatory Visit  Name: Simeon Giron      : 1955      MRN: [id number]  Encounter Provider: Liliane Chapa MD  Encounter Date: 2024   Encounter department: [facility]    Assessment & Plan  Left flank pain  Will check urine culture and if negative for infection proceed with CT renal protocol  Orders:    CT renal stone study abdomen pelvis wo contrast; Future    Urine culture; Future    Other microscopic hematuria  Check culture and if negative proceed with CT scan  Orders:    Urine culture; Future    Anxiety disorder, unspecified type  Refilled Sertraline which he takes daily and Xanax at bedtime.   Orders:    sertraline (ZOLOFT) 100 mg tablet; Take 1 tablet (100 mg total) by mouth daily    Mild depression  Refilled Sertraline.    Orders:    sertraline (ZOLOFT) 100 mg tablet; Take 1 tablet (100 mg total) by mouth daily    Long-term current use of benzodiazepine            History of Present Illness     69 year old here with his daughter for L sided flank pain started a week ago.  Worse with movement, but is fairly constant. Had labs and urine done yesterday. +Hematuria. Labs CBC and CMP within normal limits   Does have a h/o spinal stenosis.   Also requesting refills on Sertraline which takes daily, and taking Xanax at night.           Review of Systems   Genitourinary:  Positive for flank pain. Negative for dysuria and frequency.   Musculoskeletal:  Positive for back pain.   Psychiatric/Behavioral:  The patient is nervous/anxious.            Objective     /78 (BP Location: Left arm, Patient Position: Sitting)   Pulse 69   Temp 97.9 °F (36.6 °C) (Temporal)   Ht 5' 8\" (1.727 m)   Wt 127 kg (279 lb)   SpO2 100%   BMI 42.42 kg/m²     Physical Exam  Vitals and nursing note reviewed.   Constitutional:       Appearance: Normal appearance. He is obese.   Cardiovascular:      Rate and Rhythm: Normal rate and regular rhythm.      Pulses: Normal pulses.      Heart sounds: " Normal heart sounds.   Pulmonary:      Effort: Pulmonary effort is normal.      Breath sounds: Normal breath sounds.   Abdominal:      Palpations: Abdomen is soft.      Tenderness: There is no abdominal tenderness.   Musculoskeletal:        Arms:    Neurological:      General: No focal deficit present.      Mental Status: He is alert. Mental status is at baseline.   Psychiatric:         Mood and Affect: Mood normal.

## 2024-09-18 NOTE — ASSESSMENT & PLAN NOTE
Refilled Sertraline which he takes daily and Xanax at bedtime.   Orders:    sertraline (ZOLOFT) 100 mg tablet; Take 1 tablet (100 mg total) by mouth daily

## 2024-09-19 LAB
ACHR BIND AB SER-SCNC: <0.03 NMOL/L (ref 0–0.24)
BACTERIA UR CULT: NORMAL

## 2024-09-20 LAB — ACHR BLOCK AB/ACHR TOTAL SFR SER: 11 % (ref 0–25)

## 2024-09-25 ENCOUNTER — HOSPITAL ENCOUNTER (OUTPATIENT)
Dept: CT IMAGING | Facility: HOSPITAL | Age: 69
Discharge: HOME | End: 2024-09-25
Attending: FAMILY MEDICINE
Payer: MEDICARE

## 2024-09-25 DIAGNOSIS — R10.9 LEFT FLANK PAIN: ICD-10-CM

## 2024-09-25 LAB
ACHR MOD AB/ACHR TOTAL SFR SER: 4 % (ref 0–45)
GLIADIN IGG SER IA-ACNC: 18 UNITS (ref 0–19)
GLIADIN PEPTIDE IGG SER-ACNC: 3 UNITS (ref 0–19)
Lab: NORMAL
NOTE: NORMAL
TEST INFORMATION: NORMAL
TTG IGA SER-ACNC: 3 U/ML (ref 0–3)
WHEAT IGE QN: <0.1 KU/L

## 2024-09-25 PROCEDURE — 74176 CT ABD & PELVIS W/O CONTRAST: CPT

## 2024-09-26 LAB — VIT A SERPL-MCNC: 24.7 UG/DL (ref 22–69.5)

## 2024-09-27 DIAGNOSIS — R31.29 OTHER MICROSCOPIC HEMATURIA: Primary | ICD-10-CM

## 2024-09-30 ENCOUNTER — TELEPHONE (OUTPATIENT)
Age: 69
End: 2024-09-30

## 2024-09-30 NOTE — TELEPHONE ENCOUNTER
"Patient's daughter Meaghan called to establish the patient. Pt is scheduled with Dr Carpio in [place] on 10/7 at 3:15 PM.    Pt has been c/o lower left back pain, radiates to shoulder blade to front. PCP ordered UA and UC which were \"normal\" according to the daughter. A CT Scan was also ordered and done on 9/25.    Pt has also experienced gross hematuria. And experiences burning while urinating. Pt also c/o pain while \"pooping\". PCP is aware of all of these symptoms.    Call back if needed [phone]     Daughter states the patient will not answer if doesn't recognize the number. She gives permission to be contacted if unable to reach the patient: [phone]   "

## 2024-10-01 ENCOUNTER — TELEPHONE (OUTPATIENT)
Dept: PULMONOLOGY | Facility: CLINIC | Age: 69
End: 2024-10-01

## 2024-10-01 DIAGNOSIS — J44.9 CHRONIC OBSTRUCTIVE PULMONARY DISEASE, UNSPECIFIED COPD TYPE (HCC): Primary | ICD-10-CM

## 2024-10-01 NOTE — TELEPHONE ENCOUNTER
Patient called the RX Refill Line. Message is being forwarded to the office.     Patient was given samples of Airsupra (Albuterol-Budesonide) 2 inhalations Q 6 HRS PRN needs prescription out of samples.Please send prescription to   [facility] - [address] [address]  Phone: [phone]  Fax: [phone]   Mail order needs 90 day supply     Any question Please contact patient Daughter Meaghan at [phone]

## 2024-10-07 ENCOUNTER — OFFICE VISIT (OUTPATIENT)
Age: 69
End: 2024-10-07
Payer: MEDICARE

## 2024-10-07 VITALS
BODY MASS INDEX: 42.13 KG/M2 | OXYGEN SATURATION: 98 % | SYSTOLIC BLOOD PRESSURE: 130 MMHG | HEIGHT: 68 IN | HEART RATE: 58 BPM | DIASTOLIC BLOOD PRESSURE: 80 MMHG | WEIGHT: 278 LBS

## 2024-10-07 DIAGNOSIS — N40.1 BENIGN LOCALIZED PROSTATIC HYPERPLASIA WITH LOWER URINARY TRACT SYMPTOMS (LUTS): ICD-10-CM

## 2024-10-07 DIAGNOSIS — R31.0 GROSS HEMATURIA: Primary | ICD-10-CM

## 2024-10-07 DIAGNOSIS — Z12.5 SCREENING FOR PROSTATE CANCER: ICD-10-CM

## 2024-10-07 DIAGNOSIS — M54.50 ACUTE LEFT-SIDED LOW BACK PAIN WITHOUT SCIATICA: ICD-10-CM

## 2024-10-07 PROCEDURE — 88112 CYTOPATH CELL ENHANCE TECH: CPT | Performed by: PATHOLOGY

## 2024-10-07 PROCEDURE — 81001 URINALYSIS AUTO W/SCOPE: CPT | Performed by: UROLOGY

## 2024-10-07 PROCEDURE — 99204 OFFICE O/P NEW MOD 45 MIN: CPT | Performed by: UROLOGY

## 2024-10-07 RX ORDER — TAMSULOSIN HYDROCHLORIDE 0.4 MG/1
0.4 CAPSULE ORAL
Qty: 90 CAPSULE | Refills: 3 | Status: SHIPPED | OUTPATIENT
Start: 2024-10-07 | End: 2025-10-02

## 2024-10-07 NOTE — ASSESSMENT & PLAN NOTE
The patient had self-limited gross hematuria of unclear etiology in the setting of his left-sided flank pain.  Originally this would seem like a stone passing but Noncon CT did not show any evidence of stone or hydronephrosis and his pain persists.  Urine culture was negative although urinalysis at the time did show hematuria.  Plan for cytology and repeat urinalysis now.  Also plan for cystoscopy.  He prefer to do this in [place] if possible.  He had a CT scan without contrast and we discussed that the standard protocol would be a CT IVP but I think it is low yield to repeat.  After discussion the patient would prefer to repeat the scan.

## 2024-10-07 NOTE — Clinical Note
Hey you might see this patient back for cystoscopy because he prefers to be seen up in the [place] area rather than down here.

## 2024-10-07 NOTE — ASSESSMENT & PLAN NOTE
The patient has symptoms of frequency and urgency primarily along with some weakened stream and hesitancy.  His prostate is not very large on imaging.  We discussed options and I recommend a trial of Flomax.    I described Flomax and the other alpha blockers.  I explained the mechanism of action and also the potential side effects including retrograde ejaculation, lower blood pressure (which is usually temporary) and risk for floppy iris syndrome which could be a problem (That can be mitigated) if having cataract surgery.  It usually takes 1-2 weeks to see an effect.  We using dose once a day and in select patients consider going to b.i.d. dosing.

## 2024-10-07 NOTE — ASSESSMENT & PLAN NOTE
It is not clear what etiology patient's left sided back and lower abdominal pain is.  It does have distribution typically seen with kidney and stone type pain but he has no hydronephrosis or stones seen on imaging.  We will get a another evaluation with CT scan with contrast but I think it is low yield to show something additional and told this to the patient and his daughter.  I recommend he follow-up with his PCP and consider additional workup as this may be a lumbar spine based issue.

## 2024-10-07 NOTE — PROGRESS NOTES
Assessment/Plan:    Acute left-sided low back pain without sciatica  It is not clear what etiology patient's left sided back and lower abdominal pain is.  It does have distribution typically seen with kidney and stone type pain but he has no hydronephrosis or stones seen on imaging.  We will get a another evaluation with CT scan with contrast but I think it is low yield to show something additional and told this to the patient and his daughter.  I recommend he follow-up with his PCP and consider additional workup as this may be a lumbar spine based issue.    Benign localized prostatic hyperplasia with lower urinary tract symptoms (LUTS)  The patient has symptoms of frequency and urgency primarily along with some weakened stream and hesitancy.  His prostate is not very large on imaging.  We discussed options and I recommend a trial of Flomax.    I described Flomax and the other alpha blockers.  I explained the mechanism of action and also the potential side effects including retrograde ejaculation, lower blood pressure (which is usually temporary) and risk for floppy iris syndrome which could be a problem (That can be mitigated) if having cataract surgery.  It usually takes 1-2 weeks to see an effect.  We using dose once a day and in select patients consider going to b.i.d. dosing.    Gross hematuria  The patient had self-limited gross hematuria of unclear etiology in the setting of his left-sided flank pain.  Originally this would seem like a stone passing but Noncon CT did not show any evidence of stone or hydronephrosis and his pain persists.  Urine culture was negative although urinalysis at the time did show hematuria.  Plan for cytology and repeat urinalysis now.  Also plan for cystoscopy.  He prefer to do this in [place] if possible.  He had a CT scan without contrast and we discussed that the standard protocol would be a CT IVP but I think it is low yield to repeat.  After discussion the patient would prefer  to repeat the scan.    Screening for prostate cancer  Patient PSA is lowat 0.2 this year.  Deferred on MORIS           Subjective:      Patient ID: Simeon Giron is a 69 y.o. male.    HPI  69-year-old male with gross hematuria and left-sided abdominal and back pain.    Starting in September 2024 the patient developed pain issues in the left back radiating to the left lower abdomen and almost into the groin.  During this time he also developed self-limited gross hematuria. September 17, 2024 UA showed numerous red blood cells per high-powered field, otherwise bland.  Urine culture from September 18, 2024 was negative.  Hematuria subsequently resolved and not recurred but he continues to have pain issues in the left side. CT scan without contrast September 25, 2024 was unremarkable without stones or hydronephrosis.    He does endorse baseline voiding issues with frequency urgency and weak stream with an AUA score of 15/5.  His prostate was not enlarged on CT scan.    PSA 0.2 in 2024.    Past Surgical History:   Procedure Laterality Date    ANKLE ARTHROPLASTY      CARDIAC ELECTROPHYSIOLOGY MAPPING AND ABLATION      CARPAL TUNNEL RELEASE      CATARACT EXTRACTION Bilateral     LAPAROSCOPIC GASTRIC BANDING      REPLACEMENT TOTAL KNEE Left         Past Medical History:   Diagnosis Date    Atrial fibrillation (HCC)     GERD (gastroesophageal reflux disease)     Hyperlipidemia     Hypertension     Irritable bowel syndrome     Obesity     Vocal cord dysfunction         AUA SYMPTOM SCORE      Flowsheet Row Most Recent Value   AUA SYMPTOM SCORE    How often have you had a sensation of not emptying your bladder completely after you finished urinating? 2 (P)     How often have you had to urinate again less than two hours after you finished urinating? 4 (P)     How often have you found you stopped and started again several times when you urinate? 2 (P)     How often have you found it difficult to postpone urination? 5 (P)     How  "often have you had a weak urinary stream? 1 (P)     How often have you had to push or strain to begin urination? 0 (P)     How many times did you most typically get up to urinate from the time you went to bed at night until the time you got up in the morning? 1 (P)     Quality of Life: If you were to spend the rest of your life with your urinary condition just the way it is now, how would you feel about that? 5 (P)     AUA SYMPTOM SCORE 15 (P)               Review of Systems   Constitutional:  Negative for chills and fever.   HENT:  Negative for ear pain and sore throat.    Eyes:  Negative for pain and visual disturbance.   Respiratory:  Negative for cough and shortness of breath.    Cardiovascular:  Negative for chest pain and palpitations.   Gastrointestinal:  Negative for abdominal pain and vomiting.   Genitourinary:  Negative for dysuria and hematuria.   Musculoskeletal:  Negative for arthralgias and back pain.   Skin:  Negative for color change and rash.   Neurological:  Negative for seizures and syncope.   All other systems reviewed and are negative.        Objective:      /80 (BP Location: Left arm, Patient Position: Sitting, Cuff Size: Adult)   Pulse 58   Ht 5' 8\" (1.727 m)   Wt 126 kg (278 lb)   SpO2 98%   BMI 42.27 kg/m²     Lab Results   Component Value Date    PSA 0.23 02/08/2024          Physical Exam  Vitals reviewed.   Constitutional:       Appearance: Normal appearance. He is normal weight.   HENT:      Head: Normocephalic and atraumatic.   Eyes:      Pupils: Pupils are equal, round, and reactive to light.   Abdominal:      General: Abdomen is flat. There is no distension.      Palpations: There is no mass.      Tenderness: There is no abdominal tenderness. There is no right CVA tenderness, left CVA tenderness, guarding or rebound.      Hernia: No hernia is present.   Genitourinary:     Comments: Patient deferred on MORIS  Neurological:      General: No focal deficit present.      Mental " Status: He is alert and oriented to person, place, and time.   Psychiatric:         Mood and Affect: Mood normal.         Thought Content: Thought content normal.           I personally viewed the patient's CT scan and his kidneys look normal bilaterally with no hydronephrosis or stone seen on either side and specifically left side where he is been having his pain.  CT ABDOMEN AND PELVIS WITHOUT IV CONTRAST - LOW DOSE RENAL STONE     INDICATION: R10.9: Unspecified abdominal pain.     COMPARISON: None.     TECHNIQUE: Low radiation dose thin section CT examination of the abdomen and pelvis was performed without intravenous or oral contrast according to a protocol specifically designed to evaluate for urinary tract calculus. Axial, sagittal, and coronal 2D   reformatted images were created from the source data and submitted for interpretation. Evaluation for pathology in the abdomen and pelvis that is unrelated to urinary tract calculi is limited.     Radiation dose length product (DLP) for this visit: 843 mGy-cm . This examination, like all CT scans performed in the [facility], was performed utilizing techniques to minimize radiation dose exposure, including the use of iterative   reconstruction and automated exposure control.     URINARY TRACT FINDINGS:     RIGHT KIDNEY AND URETER: No urinary tract calculi. No hydronephrosis or hydroureter. Right renal cysts are present.     LEFT KIDNEY AND URETER: No urinary tract calculi. No hydronephrosis or hydroureter.     URINARY BLADDER: Unremarkable.        ADDITIONAL FINDINGS:     LOWER CHEST: No clinically significant abnormality in the visualized lower chest.     SOLID VISCERA: Limited low radiation dose noncontrast CT evaluation demonstrates no clinically significant abnormality of the imaged portions of the liver, spleen, pancreas, or adrenal glands.     GALLBLADDER/BILIARY TREE: No calcified gallstones. No pericholecystic inflammatory change. No biliary  dilation.     STOMACH AND BOWEL: Lap band is present in expected location.     APPENDIX: No findings to suggest appendicitis.     ABDOMINOPELVIC CAVITY: No ascites. No pneumoperitoneum. No lymphadenopathy.     VESSELS: Unremarkable for patient's age.     REPRODUCTIVE ORGANS: Unremarkable for patient's age.     ABDOMINAL WALL/INGUINAL REGIONS: Unremarkable.     BONES: No acute fracture or suspicious osseous lesion.     IMPRESSION:     No acute intra-abdominal abnormality. No urinary calculi identified.    Orders  Orders Placed This Encounter   Procedures    CT renal protocol     Standing Status:   Future     Standing Expiration Date:   10/7/2028     Scheduling Instructions:      Nothing to eat 3 hours prior to your test.  Clear liquids are also permitted up until the time of the scan.  Clear liquids include water, black coffee or tea, apple juice or clear broth. If possible wear clothing without any metal in the abdomen area. Sweat suit, shorts, sports bra or bra without       underwire may eliminate the need to change. Please bring your insurance cards, a form of photo ID and a list of your medications with you. Arrive 15 minutes prior to your appointment time in order to register. On the day of your test, please bring any prior CT or MRI studies of this area with you       that were not performed at a [facility].            To schedule this appointment, please contact Central Scheduling at [phone].     Order Specific Question:   What is the patient's sedation requirement?     Answer:   No Sedation     Order Specific Question:   Contrast information:     Answer:   IV     Order Specific Question:   Did the patient ever have a reaction to CT contrast? If yes, please verify the type of reaction and order the contrast allergy prep.     Answer:   No     Order Specific Question:   Release to patient through [facility]     Answer:   Immediate     Order Specific Question:   Reason for Exam     Answer:    Hematuria. also persistent left flank pain.  Please evaluate for renal or ureteral lesions. also for any source of left flank pain    Urinalysis with microscopic

## 2024-10-08 ENCOUNTER — TELEPHONE (OUTPATIENT)
Dept: UROLOGY | Facility: CLINIC | Age: 69
End: 2024-10-08

## 2024-10-08 DIAGNOSIS — R39.9 UTI SYMPTOMS: Primary | ICD-10-CM

## 2024-10-08 DIAGNOSIS — R31.0 GROSS HEMATURIA: ICD-10-CM

## 2024-10-08 LAB
BACTERIA UR QL AUTO: ABNORMAL /HPF
BILIRUB UR QL STRIP: NEGATIVE
CLARITY UR: CLEAR
COLOR UR: ABNORMAL
GLUCOSE UR STRIP-MCNC: NEGATIVE MG/DL
HGB UR QL STRIP.AUTO: NEGATIVE
KETONES UR STRIP-MCNC: NEGATIVE MG/DL
LEUKOCYTE ESTERASE UR QL STRIP: NEGATIVE
MUCOUS THREADS UR QL AUTO: ABNORMAL
NITRITE UR QL STRIP: NEGATIVE
NON-SQ EPI CELLS URNS QL MICRO: ABNORMAL /HPF
PH UR STRIP.AUTO: 6 [PH]
PROT UR STRIP-MCNC: ABNORMAL MG/DL
RBC #/AREA URNS AUTO: ABNORMAL /HPF
SP GR UR STRIP.AUTO: 1.02 (ref 1–1.03)
UROBILINOGEN UR STRIP-ACNC: <2 MG/DL
WBC #/AREA URNS AUTO: ABNORMAL /HPF

## 2024-10-08 RX ORDER — BUDESONIDE AND FORMOTEROL FUMARATE DIHYDRATE 160; 4.5 UG/1; UG/1
2 AEROSOL RESPIRATORY (INHALATION) 2 TIMES DAILY
Qty: 30.6 G | Refills: 1 | Status: SHIPPED | OUTPATIENT
Start: 2024-10-08

## 2024-10-08 NOTE — TELEPHONE ENCOUNTER
Yes, he is scheduled to see you in [place] on 11/25/24      ----- Message from Dung Ruiz DO sent at 10/7/2024  4:52 PM EDT -----  Thanks AS    Celeste/Zaida was this luisito already put on my BV schedule for cysto?  ----- Message -----  From: Eduard Carpio MD  Sent: 10/7/2024   4:03 PM EDT  To: Dung Ruiz, DO    Hey you might see this patient back for cystoscopy because he prefers to be seen up in the [place] area rather than down here.

## 2024-10-08 NOTE — TELEPHONE ENCOUNTER
Gayla with [employer] calling. States insurance does not cover Air Supra. Preferred alternatives are Proair and Breyna.

## 2024-10-08 NOTE — TELEPHONE ENCOUNTER
Please let patient know that Breyna was sent to the pharmacy in place of the Airsupra due to coverage needs. It should be taken two puffs twice a day and rinse mouth after use.

## 2024-10-08 NOTE — TELEPHONE ENCOUNTER
Spoke with pt and made him aware that Breyna was sent to the pharmacy instead of Airsupra for coverage purposes.       Informed hi that it should be taken 2 (two) puffs twice daily and to rinse mouth after use.     He got a call in the middle of our call from [employer]. Pt wanted to ensure that this medication was going to last longer than AirSupra because it cost about $200 informed him if cost is too high we can see if there is any alternatives. He said that's fine and will give this a try for now.

## 2024-10-10 PROCEDURE — 88112 CYTOPATH CELL ENHANCE TECH: CPT | Performed by: PATHOLOGY

## 2024-10-28 ENCOUNTER — HOSPITAL ENCOUNTER (OUTPATIENT)
Dept: CT IMAGING | Facility: HOSPITAL | Age: 69
Discharge: HOME | End: 2024-10-28
Attending: UROLOGY
Payer: MEDICARE

## 2024-10-28 DIAGNOSIS — R31.0 GROSS HEMATURIA: ICD-10-CM

## 2024-10-28 PROCEDURE — 74178 CT ABD&PLV WO CNTR FLWD CNTR: CPT

## 2024-10-28 RX ADMIN — IOHEXOL 100 ML: 350 INJECTION, SOLUTION INTRAVENOUS at 13:31

## 2024-11-06 PROBLEM — Z12.5 SCREENING FOR PROSTATE CANCER: Status: RESOLVED | Noted: 2024-10-07 | Resolved: 2024-11-06

## 2024-11-07 NOTE — TELEPHONE ENCOUNTER
Patient's daughter calling as patient reports left lumbar pain that radiates to the front, pain seems to be worsening and pain rated at a 7/10. Reports this pain has been ongoing along with intermittent gross hematuria without clots and difficulty urinating at times for the past month and a half. Patient had a CT scan however they never received the results. Reports chills on and off without fever.  Patient drinks tea and coffee and eats spicy foods.  Reviewed bladder irritants to avoid, and to stay hydrated with at least 64 oz. Of water/day as long as no fluid restrictions. Reviewed to try OTC pain medication as directed and heating pad for pain. Emergency room precautions reviewed as well.     Reviewed cystoscopy is scheduled 11/25/2024, added to [facility] wait list as they would like to be seen sooner. Will have urine testing due to chills.   Please review CT and advise:    CT from 10/28:    CT renal protocol  Status: Final result     PACS Images     Show images for CT renal protocol  Study Result    Narrative & Impression   CT ABDOMEN AND PELVIS WITH AND WITHOUT IV CONTRAST     INDICATION: R31.0: Gross hematuria. Left flank pain.     COMPARISON: CT abdomen pelvis 9/25/2024.     TECHNIQUE: Initial CT of the abdomen and pelvis was performed without intravenous contrast. Subsequent dynamic CT evaluation of the abdomen and pelvis was performed after the administration of contrast in both nephrographic and delayed phases.    Multiplanar 2D reformatted images were created from the source data.     This examination, like all CT scans performed in the [facility], was performed utilizing techniques to minimize radiation dose exposure, including the use of iterative reconstruction and automated exposure control. Radiation dose length   product (DLP) for this visit: 4050 mGy-cm     IV Contrast: 100 mL of iohexol (OMNIPAQUE)  Enteric Contrast: Not administered.     FINDINGS:      ABDOMEN     RIGHT KIDNEY AND URETER:  No solid renal mass or detectable urothelial mass. Simple renal cyst(s).  No hydronephrosis or hydroureter.  No urinary tract calculi.  No perinephric collection.     LEFT KIDNEY AND URETER:  No solid renal mass or detectable urothelial mass. Simple renal cyst(s).  No hydronephrosis or hydroureter.  No urinary tract calculi.  No perinephric collection.     URINARY BLADDER:  No bladder wall mass.  No calculi.        LOWER CHEST: Calcified granuloma right middle lobe.     Multiple solid pulmonary nodules as follows:  A 3 mm left lower lobe pleural-based nodule (3/45).  A 4 mm right lower lobe nodule (series 5 image 22).  A 2 mm right lower lobe nodule (series 5 image 30).     LIVER/BILIARY TREE: Unremarkable.     GALLBLADDER: No calcified gallstones. No pericholecystic inflammatory change.     SPLEEN: Unremarkable.     PANCREAS: Unremarkable.     ADRENAL GLANDS: Unremarkable.     STOMACH AND BOWEL: Postsurgical changes of lap band surgery with port in the right upper quadrant abdominal wall soft tissues. Colonic diverticulosis without findings of acute diverticulitis.     APPENDIX: Normal.     ABDOMINOPELVIC CAVITY: No ascites. No pneumoperitoneum. No lymphadenopathy. 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     VESSELS: Atherosclerosis without abdominal aortic aneurysm.     PELVIS     REPRODUCTIVE ORGANS: Unremarkable for patient's age.     ABDOMINAL WALL/INGUINAL REGIONS: Small fat-containing left inguinal hernia.     BONES: No acute fracture or suspicious osseous lesion. Spinal degenerative changes.     IMPRESSION:     1. No suspicious renal mass or upper tract urothelial lesion.     2. Multiple pulmonary nodules measuring up to 4 mm. Based on current Fleischner Society 2017 Guidelines on incidental pulmonary nodule, no routine follow-up is needed if the patient is considered low  risk for lung cancer.  If the patient is considered   high risk for lung cancer, 12 month follow-up non-contrast chest CT is recommended.        The study was marked in EPIC for significant notification.

## 2024-11-07 NOTE — TELEPHONE ENCOUNTER
Patients daughter calling in to see if there is anyway that patient can be seen sooner.     He is still having gross blood in his urine at least once or twice a week.     Over the past two weeks he has had consistent 4-5 flank pain now hitting 6-7 out of 10. 7 today.     Patient does have episodes of chills. Denies fever.     Patient did not get CT results.     Patients daughter was transferred to urology triage team for further evaluation.

## 2024-11-13 ENCOUNTER — TELEPHONE (OUTPATIENT)
Dept: ADMINISTRATIVE | Facility: OTHER | Age: 69
End: 2024-11-13

## 2024-11-13 NOTE — TELEPHONE ENCOUNTER
Pt daughter calling in regards to CT scan results.     Pt daughter is requesting a call back from clinical or provider to review results prior to upcoming appt on 11/25 due to she will not be able to attend appt with pt and is concerned about the results.     Call back- [phone] Meaghan (daughter)

## 2024-11-14 ENCOUNTER — TELEPHONE (OUTPATIENT)
Age: 69
End: 2024-11-14

## 2024-11-14 ENCOUNTER — APPOINTMENT (OUTPATIENT)
Dept: LAB | Facility: CLINIC | Age: 69
End: 2024-11-14
Payer: MEDICARE

## 2024-11-14 ENCOUNTER — OFFICE VISIT (OUTPATIENT)
Dept: FAMILY MEDICINE CLINIC | Facility: CLINIC | Age: 69
End: 2024-11-14
Payer: MEDICARE

## 2024-11-14 VITALS
WEIGHT: 276.8 LBS | SYSTOLIC BLOOD PRESSURE: 138 MMHG | TEMPERATURE: 98.4 F | HEART RATE: 62 BPM | OXYGEN SATURATION: 100 % | HEIGHT: 68 IN | DIASTOLIC BLOOD PRESSURE: 78 MMHG | BODY MASS INDEX: 41.95 KG/M2

## 2024-11-14 DIAGNOSIS — N64.4 PAIN OF RIGHT BREAST: ICD-10-CM

## 2024-11-14 DIAGNOSIS — R39.9 UTI SYMPTOMS: ICD-10-CM

## 2024-11-14 DIAGNOSIS — M51.360 DEGENERATION OF INTERVERTEBRAL DISC OF LUMBAR REGION WITH DISCOGENIC BACK PAIN: ICD-10-CM

## 2024-11-14 DIAGNOSIS — L98.9 SKIN LESION OF CHEEK: ICD-10-CM

## 2024-11-14 DIAGNOSIS — R31.29 OTHER MICROSCOPIC HEMATURIA: ICD-10-CM

## 2024-11-14 DIAGNOSIS — R91.1 LUNG NODULE SEEN ON IMAGING STUDY: Primary | ICD-10-CM

## 2024-11-14 DIAGNOSIS — R31.0 GROSS HEMATURIA: ICD-10-CM

## 2024-11-14 DIAGNOSIS — Z23 ENCOUNTER FOR IMMUNIZATION: ICD-10-CM

## 2024-11-14 DIAGNOSIS — J44.9 CHRONIC OBSTRUCTIVE PULMONARY DISEASE, UNSPECIFIED COPD TYPE (HCC): ICD-10-CM

## 2024-11-14 LAB
BACTERIA UR QL AUTO: ABNORMAL /HPF
BILIRUB UR QL STRIP: NEGATIVE
CLARITY UR: CLEAR
COLOR UR: YELLOW
GLUCOSE UR STRIP-MCNC: NEGATIVE MG/DL
HGB UR QL STRIP.AUTO: NEGATIVE
KETONES UR STRIP-MCNC: NEGATIVE MG/DL
LEUKOCYTE ESTERASE UR QL STRIP: NEGATIVE
MUCOUS THREADS UR QL AUTO: ABNORMAL
NITRITE UR QL STRIP: NEGATIVE
NON-SQ EPI CELLS URNS QL MICRO: ABNORMAL /HPF
PH UR STRIP.AUTO: 6 [PH]
PROT UR STRIP-MCNC: ABNORMAL MG/DL
RBC #/AREA URNS AUTO: ABNORMAL /HPF
SP GR UR STRIP.AUTO: 1.02 (ref 1–1.03)
UROBILINOGEN UR STRIP-ACNC: <2 MG/DL
WBC #/AREA URNS AUTO: ABNORMAL /HPF

## 2024-11-14 PROCEDURE — 99214 OFFICE O/P EST MOD 30 MIN: CPT | Performed by: FAMILY MEDICINE

## 2024-11-14 PROCEDURE — G0008 ADMIN INFLUENZA VIRUS VAC: HCPCS

## 2024-11-14 PROCEDURE — 87086 URINE CULTURE/COLONY COUNT: CPT

## 2024-11-14 PROCEDURE — 81001 URINALYSIS AUTO W/SCOPE: CPT

## 2024-11-14 PROCEDURE — 90662 IIV NO PRSV INCREASED AG IM: CPT

## 2024-11-14 RX ORDER — LIDOCAINE 50 MG/G
1 PATCH TOPICAL DAILY
Start: 2024-11-14

## 2024-11-14 NOTE — ASSESSMENT & PLAN NOTE
Advised spine & pain consultation  Trial Lidocaine patches.  Is on Gabapentin.  Can take Tylenol. No NSAIDs (on Pradaxa)  Orders:    Ambulatory referral to Spine & Pain Management; Future    lidocaine (Lidoderm) 5 %; Apply 1 patch topically over 12 hours daily Remove & Discard patch within 12 hours or as directed by MD

## 2024-11-14 NOTE — ASSESSMENT & PLAN NOTE
Will check CT chest and advised follow-up with Pulmonary   Orders:    CT lung nodule follow-up; Future

## 2024-11-14 NOTE — PROGRESS NOTES
Name: Simeon Giron      : 1955      MRN: [id number]  Encounter Provider: Liliane Chapa MD  Encounter Date: 2024   Encounter department: [facility]  :  Assessment & Plan  Lung nodule seen on imaging study  Will check CT chest and advised follow-up with Pulmonary   Orders:    CT lung nodule follow-up; Future    Degeneration of intervertebral disc of lumbar region with discogenic back pain  Advised spine & pain consultation  Trial Lidocaine patches.  Is on Gabapentin.  Can take Tylenol. No NSAIDs (on Pradaxa)  Orders:    Ambulatory referral to Spine & Pain Management; Future    lidocaine (Lidoderm) 5 %; Apply 1 patch topically over 12 hours daily Remove & Discard patch within 12 hours or as directed by MD    Chronic obstructive pulmonary disease, unspecified COPD type (HCC)  On inhaler and sees pulmonary   Former smoker         Encounter for immunization    Orders:    influenza vaccine, high-dose, PF 0.5 mL (Fluzone High Dose)    Skin lesion of cheek  Unclear etiology  Concern for possible BCC (see photo). Likely needs biopsy.  Refer to derm  Orders:    Ambulatory Referral to Dermatology; Future    Pain of right breast  Will check u/s  Orders:    US breast right limited (diagnostic); Future           History of Present Illness     He is here with his daughter. He is here for a 6 month follow up and concerns  He has been having left flank pain -underwent urological work up. CT renal protocol was negative for any renal pathology.  He is having cystoscopy later this month.  He saw urology, they felt his pain is likely musculoskeletal.  He has pain starts in the shoulder blade radiates around to the abdomen. Worse with taking deep breath or coughing.  Worse with bending twisting. He had a lumbar CT in 2024 showed DDD.    His CT abd did show lung nodules.  He has COPD and is on inhalers - he has a pulmonologist and has appt in December.   Reviewed prior CT from 2018, he had  "at least 1 nodule mentioned at that time.   Former smoker, quit 1982, smoked 3-4 ppd for 11 years.    He complains of PND and hoarseness.  He saw ENT for this.  He tried allergy pills.  He was told he has chronic sinusitis. He is using Atrovent nasal spray.      He also reports pain in the R breast for several months. He has an area where he noticed skin changes, a black head. Denies discharge.      Flank Pain      Review of Systems   HENT:  Positive for postnasal drip.    Respiratory:  Positive for cough and shortness of breath.    Genitourinary:  Positive for flank pain and hematuria.   Musculoskeletal:  Positive for back pain.   Skin:         Lesion on cheek  Pain in R breast           Objective   /78   Pulse 62   Temp 98.4 °F (36.9 °C)   Ht 5' 8\" (1.727 m)   Wt 126 kg (276 lb 12.8 oz)   SpO2 100%   BMI 42.09 kg/m²      Physical Exam  Vitals and nursing note reviewed.   Constitutional:       General: He is not in acute distress.     Appearance: He is well-developed. He is obese. He is not diaphoretic.   HENT:      Head: Normocephalic and atraumatic.      Right Ear: External ear normal.      Left Ear: External ear normal.   Cardiovascular:      Rate and Rhythm: Normal rate and regular rhythm.      Heart sounds: Normal heart sounds. No murmur heard.     No friction rub. No gallop.   Pulmonary:      Effort: Pulmonary effort is normal. No respiratory distress.      Breath sounds: Normal breath sounds. No stridor. No wheezing or rales.   Chest:   Breasts:     Right: Skin change and tenderness present. No bleeding, inverted nipple, mass or nipple discharge.      Left: Normal.       Abdominal:      Palpations: Abdomen is soft.   Skin:     Findings: Lesion (left cheek see photo) present. No rash.   Neurological:      General: No focal deficit present.      Mental Status: He is alert.   Psychiatric:         Mood and Affect: Mood normal.         Behavior: Behavior normal.         Thought Content: Thought " content normal.         Judgment: Judgment normal.

## 2024-11-15 ENCOUNTER — RESULTS FOLLOW-UP (OUTPATIENT)
Dept: FAMILY MEDICINE CLINIC | Facility: CLINIC | Age: 69
End: 2024-11-15

## 2024-11-15 LAB — BACTERIA UR CULT: NORMAL

## 2024-11-15 NOTE — TELEPHONE ENCOUNTER
Tried calling pt's daughter with no answer. Mailbox is full, could not leave message. If she calls back, please relay message from Charleen.

## 2024-11-19 ENCOUNTER — HOSPITAL ENCOUNTER (OUTPATIENT)
Dept: CT IMAGING | Facility: CLINIC | Age: 69
Discharge: HOME | End: 2024-11-19
Payer: MEDICARE

## 2024-11-19 DIAGNOSIS — R91.1 LUNG NODULE SEEN ON IMAGING STUDY: ICD-10-CM

## 2024-11-19 PROCEDURE — 71250 CT THORAX DX C-: CPT

## 2024-11-22 NOTE — PROGRESS NOTES
"69-year-old male with gross hematuria    Anticoagulation: Pradaxa    Seen in office October 2024 and admitted to gross hematuria    U cyto 10/7/24: neg    CT renal protocol 10/28/2024: No suspicious renal mass or upper tract urothelial lesions; multiple pulmonary nodules measuring up to 4 mm (no routine follow-up needed if patient considered low risk for lung cancer, if patient considered high risk for lung cancer, needs 12-month follow-up noncontrast chest CT)    U dip 11/25/24: neg           Cystoscopy     Date/Time  11/25/2024 1:00 PM     Performed by  Dung Ruiz DO   Authorized by  Dung Ruiz DO     Universal Protocol:  procedure performed by consultantConsent: Verbal consent obtained. Written consent obtained.  Risks and benefits: risks, benefits and alternatives were discussed  Consent given by: patient  Time out: Immediately prior to procedure a \"time out\" was called to verify the correct patient, procedure, equipment, support staff and site/side marked as required.  Timeout called at: 11/25/2024 1:44 PM.  Patient understanding: patient states understanding of the procedure being performed  Patient consent: the patient's understanding of the procedure matches consent given  Procedure consent: procedure consent matches procedure scheduled  Relevant documents: relevant documents present and verified  Required items: required blood products, implants, devices, and special equipment available  Patient identity confirmed: verbally with patient      Procedure Details:  Procedure type: cystoscopy    Additional Procedure Details: Office Cystoscopy Procedure Note    Indication:    Hematuria    Informed consent   The risks, benefits, complications, treatment options, and expected outcomes were discussed with the patient. The patient concurred with the proposed plan and provided informed consent.    Anesthesia  Lidocaine jelly 2%    Antibiotic prophylaxis   None    Procedure  The patient was " placed in the supineposition, was prepped and draped in the usual manner using sterile technique, and 2% lidocaine jelly instilled into the urethra.  A 17 F flexible cystoscope was then inserted into the urethra and the urethra and bladder carefully examined.  The following findings were noted:    Findings:  Urethra:  2 separate focal strictures about 18 Fr at bulbar urethra, both accommodated the scope  Prostate:  BL lateral lobe hypertrophy, mod median lobe, no lesions; short prostate  Bladder:  No lesions; no stones; dome chimney; mod to severe trabeculations  Ureteral orifices:  orthotopic  Other findings:  None, retroflexed view confirms    Specimens: None                 Complications:    None; patient tolerated the procedure well           Disposition: To home            Condition: Stable              PLAN  -Msg sent to PCP regarding fu for the pulm nodules seen on imaging  -Cont Flomax  -AP visit in about 3 months for symptom check with PVR. He understands that his urethral strictures were mild would not need intervention unless he had difficulty emptying his bladder in the future. Also understands that may need intervention for prostate in future if not improved with Flomax.

## 2024-11-25 ENCOUNTER — PROCEDURE VISIT (OUTPATIENT)
Dept: UROLOGY | Facility: CLINIC | Age: 69
End: 2024-11-25
Payer: MEDICARE

## 2024-11-25 ENCOUNTER — RESULTS FOLLOW-UP (OUTPATIENT)
Dept: FAMILY MEDICINE CLINIC | Facility: CLINIC | Age: 69
End: 2024-11-25

## 2024-11-25 VITALS
HEIGHT: 68 IN | SYSTOLIC BLOOD PRESSURE: 134 MMHG | WEIGHT: 278 LBS | OXYGEN SATURATION: 97 % | DIASTOLIC BLOOD PRESSURE: 86 MMHG | HEART RATE: 81 BPM | TEMPERATURE: 98.3 F | BODY MASS INDEX: 42.13 KG/M2

## 2024-11-25 DIAGNOSIS — R91.1 LUNG NODULE SEEN ON IMAGING STUDY: Primary | ICD-10-CM

## 2024-11-25 DIAGNOSIS — I27.20 PULMONARY HYPERTENSION (HCC): Primary | ICD-10-CM

## 2024-11-25 DIAGNOSIS — R31.0 GROSS HEMATURIA: Primary | ICD-10-CM

## 2024-11-25 LAB
SL AMB  POCT GLUCOSE, UA: NORMAL
SL AMB LEUKOCYTE ESTERASE,UA: NORMAL
SL AMB POCT BILIRUBIN,UA: NORMAL
SL AMB POCT BLOOD,UA: NORMAL
SL AMB POCT CLARITY,UA: CLEAR
SL AMB POCT COLOR,UA: YELLOW
SL AMB POCT KETONES,UA: NORMAL
SL AMB POCT NITRITE,UA: NORMAL
SL AMB POCT PH,UA: 6
SL AMB POCT SPECIFIC GRAVITY,UA: 1.01
SL AMB POCT URINE PROTEIN: NORMAL
SL AMB POCT UROBILINOGEN: 0.2

## 2024-11-25 PROCEDURE — 81002 URINALYSIS NONAUTO W/O SCOPE: CPT | Performed by: UROLOGY

## 2024-11-25 PROCEDURE — 52000 CYSTOURETHROSCOPY: CPT | Performed by: UROLOGY

## 2024-11-25 NOTE — Clinical Note
Yan stark This luisito got ctu for gross hem denson Found to have some pulm nodules that will need some fu Just wanted to make you aware in case you wanted future imaging Let me know if questions or concerns. -Dung

## 2024-11-25 NOTE — PATIENT INSTRUCTIONS
You had cystoscopy done in the office today. This means that we looked inside your urethra and bladder with a camera.    You may see some blood in your urine for the next few days. This is normal. Please drink plenty of fluids. Call the office if you are passing large blood clots in your urine or if you are not able to urinate.    It may burn when you urinate for the next few days. This is normal.    Please call the office if you have fevers or chills in the next few days.    You will return to clinic in about 3 months

## 2024-12-12 ENCOUNTER — OFFICE VISIT (OUTPATIENT)
Dept: PULMONOLOGY | Facility: CLINIC | Age: 69
End: 2024-12-12
Payer: MEDICARE

## 2024-12-12 ENCOUNTER — APPOINTMENT (OUTPATIENT)
Dept: LAB | Facility: CLINIC | Age: 69
End: 2024-12-12
Payer: MEDICARE

## 2024-12-12 VITALS
WEIGHT: 273 LBS | SYSTOLIC BLOOD PRESSURE: 130 MMHG | OXYGEN SATURATION: 98 % | DIASTOLIC BLOOD PRESSURE: 82 MMHG | TEMPERATURE: 97.2 F | HEART RATE: 62 BPM | BODY MASS INDEX: 41.37 KG/M2 | HEIGHT: 68 IN

## 2024-12-12 DIAGNOSIS — J30.9 ALLERGIC RHINITIS, UNSPECIFIED SEASONALITY, UNSPECIFIED TRIGGER: ICD-10-CM

## 2024-12-12 DIAGNOSIS — J45.909 UNCOMPLICATED ASTHMA, UNSPECIFIED ASTHMA SEVERITY, UNSPECIFIED WHETHER PERSISTENT: ICD-10-CM

## 2024-12-12 DIAGNOSIS — G47.19 EXCESSIVE DAYTIME SLEEPINESS: ICD-10-CM

## 2024-12-12 DIAGNOSIS — J44.9 CHRONIC OBSTRUCTIVE PULMONARY DISEASE, UNSPECIFIED COPD TYPE (HCC): Primary | ICD-10-CM

## 2024-12-12 DIAGNOSIS — R06.83 SNORING: ICD-10-CM

## 2024-12-12 LAB
BASOPHILS # BLD AUTO: 0.1 THOUSANDS/ÂΜL (ref 0–0.1)
BASOPHILS NFR BLD AUTO: 1 % (ref 0–1)
EOSINOPHIL # BLD AUTO: 0.3 THOUSAND/ÂΜL (ref 0–0.61)
EOSINOPHIL NFR BLD AUTO: 4 % (ref 0–6)
ERYTHROCYTE [DISTWIDTH] IN BLOOD BY AUTOMATED COUNT: 12.9 % (ref 11.6–15.1)
HCT VFR BLD AUTO: 41.3 % (ref 36.5–49.3)
HGB BLD-MCNC: 14.2 G/DL (ref 12–17)
IMM GRANULOCYTES # BLD AUTO: 0.03 THOUSAND/UL (ref 0–0.2)
IMM GRANULOCYTES NFR BLD AUTO: 0 % (ref 0–2)
LYMPHOCYTES # BLD AUTO: 1.91 THOUSANDS/ÂΜL (ref 0.6–4.47)
LYMPHOCYTES NFR BLD AUTO: 26 % (ref 14–44)
MCH RBC QN AUTO: 30.7 PG (ref 26.8–34.3)
MCHC RBC AUTO-ENTMCNC: 34.4 G/DL (ref 31.4–37.4)
MCV RBC AUTO: 89 FL (ref 82–98)
MONOCYTES # BLD AUTO: 0.62 THOUSAND/ÂΜL (ref 0.17–1.22)
MONOCYTES NFR BLD AUTO: 9 % (ref 4–12)
NEUTROPHILS # BLD AUTO: 4.33 THOUSANDS/ÂΜL (ref 1.85–7.62)
NEUTS SEG NFR BLD AUTO: 60 % (ref 43–75)
NRBC BLD AUTO-RTO: 0 /100 WBCS
PLATELET # BLD AUTO: 267 THOUSANDS/UL (ref 149–390)
PMV BLD AUTO: 8.7 FL (ref 8.9–12.7)
RBC # BLD AUTO: 4.62 MILLION/UL (ref 3.88–5.62)
WBC # BLD AUTO: 7.29 THOUSAND/UL (ref 4.31–10.16)

## 2024-12-12 PROCEDURE — 99215 OFFICE O/P EST HI 40 MIN: CPT | Performed by: INTERNAL MEDICINE

## 2024-12-12 PROCEDURE — 36415 COLL VENOUS BLD VENIPUNCTURE: CPT

## 2024-12-12 PROCEDURE — 82785 ASSAY OF IGE: CPT

## 2024-12-12 PROCEDURE — 86003 ALLG SPEC IGE CRUDE XTRC EA: CPT

## 2024-12-12 PROCEDURE — 85025 COMPLETE CBC W/AUTO DIFF WBC: CPT

## 2024-12-12 RX ORDER — FLUTICASONE FUROATE, UMECLIDINIUM BROMIDE AND VILANTEROL TRIFENATATE 200; 62.5; 25 UG/1; UG/1; UG/1
1 POWDER RESPIRATORY (INHALATION) DAILY
Qty: 60 BLISTER | Refills: 3 | Status: SHIPPED | OUTPATIENT
Start: 2024-12-12 | End: 2025-01-11

## 2024-12-12 RX ORDER — ALBUTEROL SULFATE 90 UG/1
2 INHALANT RESPIRATORY (INHALATION) EVERY 6 HOURS PRN
Qty: 18 G | Refills: 3 | Status: SHIPPED | OUTPATIENT
Start: 2024-12-12

## 2024-12-12 RX ORDER — ALBUTEROL SULFATE 0.83 MG/ML
2.5 SOLUTION RESPIRATORY (INHALATION) EVERY 6 HOURS PRN
Qty: 720 ML | Refills: 3 | Status: SHIPPED | OUTPATIENT
Start: 2024-12-12

## 2024-12-12 NOTE — PROGRESS NOTES
Progress note - Pulmonary Medicine   Simeon Giron 69 y.o. male MRN: [id number]       Assessment/Plan  69 y.o. M with PMHx of paroxysmal atrial fibrillation on Pradaxa, CHF, hypertension, hyperlipidemia, obesity, RED, GERD who comes in for follow up for CT chest.    1.  Abnormal CT chest - showed enlarged pulmonary artery which may be related to pulmonary hypertension      -  Echo pending       -   Management of COPD and RED as below      -   We discussed the potential benefit of diuresis which we can explore in the future    2.  Moderate COPD - chronic bronchitis phenotype. 45 pack year, quit 36 years ago and also steam and chemical exposures as .. Significant productive cough       -  Will substitute Trelegy for Breyna as the LAMA may be helpful to clear secretions       -  Check [place] allergy panel and CBC with differential       -  Albuterol solution provided for nebulizer       -   Albuterol MDI provided.      3.   RED (AHI - unknown)  not currently on therapy.        -  Will check home study to assess for RED severity.  Would consider switch to BIPAP in hope to help with ventilation as well.         -  I also discussed in depth the risk of leaving sleep apnea untreated including hypertension, heart failure, arrhythmia, MI and stroke.    4.  Weak phonation/vocal cord issues - this may be in part due to difficulty with exhalation given his underlying lung conditions.  He is following with ENT and had laryngoscopy.  Follow with speech therapy.      5.  L lung nodules - will need repeat CT in 1 year.    6.   Sleep onset insomnia - he smokes marijuana.  We discussed using of edibles in place to see if they are helpful as well.    ______________________________________________________________________    HPI:    Simeon Giron presents today for follow-up for abnormal CT chest, COPD and RED.  He comes in to discuss his pulmonary hypertension.  He has been using Breyna since last visit.  He had  noted some benefit from Breztri previously.   He admits to productive cough.  He also was following with ENT due to weak phonation and upper airway issues.  They recommended speech therapy.  He has been attempting to schedule with them as well.  He notes dyspnea on exertion.  He also has significant mucus production.  He has mucus production from his nose as well as in his lungs.  He also notes chest congestion, tightness and occasional wheezing.      He also admits to daytime sleepiness, snoring and witnessed apnea.         Social history updates:  Social History     Tobacco Use   Smoking Status Former    Current packs/day: 0.00    Average packs/day: 2.5 packs/day for 18.0 years (45.0 ttl pk-yrs)    Types: Cigarettes    Start date:     Quit date:     Years since quittin.9    Passive exposure: Past   Smokeless Tobacco Never     Social History     Socioeconomic History    Marital status:      Spouse name: Not on file    Number of children: Not on file    Years of education: Not on file    Highest education level: Not on file   Occupational History    Not on file   Tobacco Use    Smoking status: Former     Current packs/day: 0.00     Average packs/day: 2.5 packs/day for 18.0 years (45.0 ttl pk-yrs)     Types: Cigarettes     Start date:      Quit date:      Years since quittin.9     Passive exposure: Past    Smokeless tobacco: Never   Vaping Use    Vaping status: Never Used   Substance and Sexual Activity    Alcohol use: Yes     Alcohol/week: 3.0 standard drinks of alcohol     Types: 3 Cans of beer per week     Comment: a week    Drug use: Yes     Types: Marijuana    Sexual activity: Not Currently   Other Topics Concern    Not on file   Social History Narrative    Not on file     Social Drivers of Health     Financial Resource Strain: Low Risk  (2024)    Overall Financial Resource Strain (CARDIA)     Difficulty of Paying Living Expenses: Not very hard   Food Insecurity: No Food  "Insecurity (9/22/2020)    Received from [facility]    Hunger Vital Sign     Worried About Running Out of Food in the Last Year: Never true     Ran Out of Food in the Last Year: Never true   Transportation Needs: No Transportation Needs (2/8/2024)    PRAPARE - Transportation     Lack of Transportation (Medical): No     Lack of Transportation (Non-Medical): No   Physical Activity: Not on file   Stress: Not on file   Social Connections: Unknown (6/18/2024)    Received from [employer]    Social Connections     How often do you feel lonely or isolated from those around you? (Adult - for ages 18 years and over): Not on file   Intimate Partner Violence: Not on file   Housing Stability: Not on file       PhysicalExamination:  Vitals:   /82   Pulse 62   Temp (!) 97.2 °F (36.2 °C)   Ht 5' 8\" (1.727 m)   Wt 124 kg (273 lb)   SpO2 98%   BMI 41.51 kg/m²   General: Pleasant, Awake alert and oriented x 3, conversant without conversational dyspnea, NAD, normal affect  HEENT:  PERRL, Sclera noninjected, nonicteric OU, Nares patent,  no craniofacial abnormalities, Mucous membranes, moist, no oral lesions, normal dentition, Mallampati class 4  NECK: Trachea midline, no accessory muscle use, no stridor, no cervical or supraclavicular adenopathy, JVP not elevated  CARDIAC: Reg, single s1/S2, no m/r/g  PULM: CTA bilaterally no wheezing, rhonchi or rales  ABD: Normoactive bowel sounds, soft nontender, nondistended, no rebound, no rigidity, no guarding  EXT: No cyanosis, no clubbing, no edema, normal capillary refill  NEURO: no focal neurologic deficits, AAOx3, moving all extremities appropriately      Diagnostic Data:  Labs:  I personally reviewed the most recent laboratory data pertinent to today's visit  not applicable    I have personally reviewed pertinent lab results.  Lab Results   Component Value Date    WBC 7.29 12/12/2024    HGB 14.2 12/12/2024    HCT 41.3 12/12/2024    MCV 89 12/12/2024     12/12/2024 " "    Lab Results   Component Value Date    CALCIUM 8.5 09/17/2024    K 4.2 09/17/2024    CO2 28 09/17/2024     09/17/2024    BUN 11 09/17/2024    CREATININE 0.70 09/17/2024     No results found for: \"IGE\"  Lab Results   Component Value Date    ALT 10 09/17/2024    AST 16 09/17/2024    ALKPHOS 68 09/17/2024       PFT results:  The most recent pulmonary function tests were reviewed.  Spirometry:  FEV1/FVC Ratio is 69.  FEV1 is 66% predicted.  FVC is 72% predicted.    There is a significant postbronchodilator improvement in FEV1 and  FVC.     Flow volume loop:  Obstruction     Lung volumes:  Total lung capacity is 100% predicted.  Residual volume is 139% predicted.     Diffusing capacity:  79% predicted     IMPRESSION:  Moderate obstruction, significant improvement with bronchodilator  Lung volumes show air trapping  Mild decrease in gas transfer       Imaging:  I personally reviewed the images on the PAC system pertinent to today's visit  CT chest   MPRESSION:  5 mm and smaller predominantly peripheral right greater than left lung nodules. If the chest CT from 2018 from [facility] can be obtained for direct comparison, stability can be established. In the absence of comparison   studies, follow-up with a chest CT with no contrast in 1 year is optional per 2017 Fleischner Society guidelines.  Pulmonary artery enlargement which can be a normal variant or can be seen with pulmonary hypertension.         Chidi Alcaraz,     "

## 2024-12-12 NOTE — LETTER
December 12, 2024     Liliane Chapa MD  [address]  [address]  [facility] [address]    Patient: Simeon Giron   YOB: 1955   Date of Visit: 12/12/2024       Dear Dr. Chapa:    Thank you for referring Simeon Giron to me for evaluation. Below are my notes for this consultation.    If you have questions, please do not hesitate to call me. I look forward to following your patient along with you.         Sincerely,        Chidi Alcaraz DO        CC: No Recipients    Chidi Alcaraz DO  12/12/2024  2:38 PM  Sign when Signing Visit  Progress note - Pulmonary Medicine   Simeon Giron 69 y.o. male MRN: [id number]       Assessment/Plan  69 y.o. M with PMHx of paroxysmal atrial fibrillation on Pradaxa, CHF, hypertension, hyperlipidemia, obesity, RED, GERD who comes in for follow up for CT chest.    1.  Abnormal CT chest - showed enlarged pulmonary artery which may be related to pulmonary hypertension      -  Echo pending       -   Management of COPD and RED as below      -   We discussed the potential benefit of diuresis which we can explore in the future    2.  Moderate COPD - chronic bronchitis phenotype. 45 pack year, quit 36 years ago and also steam and chemical exposures as .. Significant productive cough       -  Will substitute Trelegy for Breyna as the LAMA may be helpful to clear secretions       -  Check [place] allergy panel and CBC with differential       -  Albuterol solution provided for nebulizer       -   Albuterol MDI provided.      3.   RED (AHI - unknown)  not currently on therapy.        -  Will check home study to assess for RED severity.  Would consider switch to BIPAP in hope to help with ventilation as well.         -  I also discussed in depth the risk of leaving sleep apnea untreated including hypertension, heart failure, arrhythmia, MI and stroke.    4.  Weak phonation/vocal cord issues - this may be in part due to difficulty with  exhalation given his underlying lung conditions.  He is following with ENT and had laryngoscopy.  Follow with speech therapy.      5.  L lung nodules - will need repeat CT in 1 year.    6.   Sleep onset insomnia - he smokes marijuana.  We discussed using of edibles in place to see if they are helpful as well.    ______________________________________________________________________    HPI:    Simeon Giron presents today for follow-up for abnormal CT chest, COPD and RED.  He comes in to discuss his pulmonary hypertension.  He has been using Breyna since last visit.  He had noted some benefit from Breztri previously.   He admits to productive cough.  He also was following with ENT due to weak phonation and upper airway issues.  They recommended speech therapy.  He has been attempting to schedule with them as well.  He notes dyspnea on exertion.  He also has significant mucus production.  He has mucus production from his nose as well as in his lungs.  He also notes chest congestion, tightness and occasional wheezing.      He also admits to daytime sleepiness, snoring and witnessed apnea.         Social history updates:  Social History     Tobacco Use   Smoking Status Former   • Current packs/day: 0.00   • Average packs/day: 2.5 packs/day for 18.0 years (45.0 ttl pk-yrs)   • Types: Cigarettes   • Start date:    • Quit date:    • Years since quittin.9   • Passive exposure: Past   Smokeless Tobacco Never     Social History     Socioeconomic History   • Marital status:      Spouse name: Not on file   • Number of children: Not on file   • Years of education: Not on file   • Highest education level: Not on file   Occupational History   • Not on file   Tobacco Use   • Smoking status: Former     Current packs/day: 0.00     Average packs/day: 2.5 packs/day for 18.0 years (45.0 ttl pk-yrs)     Types: Cigarettes     Start date:      Quit date:      Years since quittin.9     Passive exposure: Past  "  • Smokeless tobacco: Never   Vaping Use   • Vaping status: Never Used   Substance and Sexual Activity   • Alcohol use: Yes     Alcohol/week: 3.0 standard drinks of alcohol     Types: 3 Cans of beer per week     Comment: a week   • Drug use: Yes     Types: Marijuana   • Sexual activity: Not Currently   Other Topics Concern   • Not on file   Social History Narrative   • Not on file     Social Drivers of Health     Financial Resource Strain: Low Risk  (2/8/2024)    Overall Financial Resource Strain (CARDIA)    • Difficulty of Paying Living Expenses: Not very hard   Food Insecurity: No Food Insecurity (9/22/2020)    Received from [facility]    Hunger Vital Sign    • Worried About Running Out of Food in the Last Year: Never true    • Ran Out of Food in the Last Year: Never true   Transportation Needs: No Transportation Needs (2/8/2024)    PRAPARE - Transportation    • Lack of Transportation (Medical): No    • Lack of Transportation (Non-Medical): No   Physical Activity: Not on file   Stress: Not on file   Social Connections: Unknown (6/18/2024)    Received from [employer]    Social Connections    • How often do you feel lonely or isolated from those around you? (Adult - for ages 18 years and over): Not on file   Intimate Partner Violence: Not on file   Housing Stability: Not on file       PhysicalExamination:  Vitals:   /82   Pulse 62   Temp (!) 97.2 °F (36.2 °C)   Ht 5' 8\" (1.727 m)   Wt 124 kg (273 lb)   SpO2 98%   BMI 41.51 kg/m²   General: Pleasant, Awake alert and oriented x 3, conversant without conversational dyspnea, NAD, normal affect  HEENT:  PERRL, Sclera noninjected, nonicteric OU, Nares patent,  no craniofacial abnormalities, Mucous membranes, moist, no oral lesions, normal dentition, Mallampati class 4  NECK: Trachea midline, no accessory muscle use, no stridor, no cervical or supraclavicular adenopathy, JVP not elevated  CARDIAC: Reg, single s1/S2, no m/r/g  PULM: CTA bilaterally no " "wheezing, rhonchi or rales  ABD: Normoactive bowel sounds, soft nontender, nondistended, no rebound, no rigidity, no guarding  EXT: No cyanosis, no clubbing, no edema, normal capillary refill  NEURO: no focal neurologic deficits, AAOx3, moving all extremities appropriately      Diagnostic Data:  Labs:  I personally reviewed the most recent laboratory data pertinent to today's visit  not applicable    I have personally reviewed pertinent lab results.  Lab Results   Component Value Date    WBC 7.29 12/12/2024    HGB 14.2 12/12/2024    HCT 41.3 12/12/2024    MCV 89 12/12/2024     12/12/2024     Lab Results   Component Value Date    CALCIUM 8.5 09/17/2024    K 4.2 09/17/2024    CO2 28 09/17/2024     09/17/2024    BUN 11 09/17/2024    CREATININE 0.70 09/17/2024     No results found for: \"IGE\"  Lab Results   Component Value Date    ALT 10 09/17/2024    AST 16 09/17/2024    ALKPHOS 68 09/17/2024       PFT results:  The most recent pulmonary function tests were reviewed.  Spirometry:  FEV1/FVC Ratio is 69.  FEV1 is 66% predicted.  FVC is 72% predicted.    There is a significant postbronchodilator improvement in FEV1 and  FVC.     Flow volume loop:  Obstruction     Lung volumes:  Total lung capacity is 100% predicted.  Residual volume is 139% predicted.     Diffusing capacity:  79% predicted     IMPRESSION:  Moderate obstruction, significant improvement with bronchodilator  Lung volumes show air trapping  Mild decrease in gas transfer       Imaging:  I personally reviewed the images on the PAC system pertinent to today's visit  CT chest   MPRESSION:  5 mm and smaller predominantly peripheral right greater than left lung nodules. If the chest CT from 2018 from [facility] can be obtained for direct comparison, stability can be established. In the absence of comparison   studies, follow-up with a chest CT with no contrast in 1 year is optional per 2017 Fleischner Society " guidelines.  Pulmonary artery enlargement which can be a normal variant or can be seen with pulmonary hypertension.         Chidi Alcaraz, DO

## 2024-12-13 LAB
A ALTERNATA IGE QN: <0.1 KUA/I (ref 0–0.1)
A FUMIGATUS IGE QN: 0.37 KUA/I (ref 0–0.1)
BERMUDA GRASS IGE QN: <0.1 KUA/I (ref 0–0.1)
BOXELDER IGE QN: <0.1 KUA/I (ref 0–0.1)
C HERBARUM IGE QN: <0.1 KUA/I (ref 0–0.1)
CAT DANDER IGE QN: <0.1 KUA/I (ref 0–0.1)
CMN PIGWEED IGE QN: <0.1 KUA/I (ref 0–0.1)
COMMON RAGWEED IGE QN: <0.1 KUA/I (ref 0–0.1)
COTTONWOOD IGE QN: 0.32 KUA/I (ref 0–0.1)
D FARINAE IGE QN: 2.05 KUA/I (ref 0–0.1)
D PTERONYSS IGE QN: 1.21 KUA/I (ref 0–0.1)
DOG DANDER IGE QN: <0.1 KUA/I (ref 0–0.1)
LONDON PLANE IGE QN: <0.1 KUA/I (ref 0–0.1)
MOUSE URINE PROT IGE QN: <0.1 KUA/I (ref 0–0.1)
MT JUNIPER IGE QN: <0.1 KUA/I (ref 0–0.1)
MUGWORT IGE QN: <0.1 KUA/I (ref 0–0.1)
P NOTATUM IGE QN: 0.2 KUA/I (ref 0–0.1)
ROACH IGE QN: <0.1 KUA/I (ref 0–0.1)
SHEEP SORREL IGE QN: <0.1 KUA/I (ref 0–0.1)
SILVER BIRCH IGE QN: <0.1 KUA/I (ref 0–0.1)
TIMOTHY IGE QN: <0.1 KUA/I (ref 0–0.1)
TOTAL IGE SMQN RAST: 101 KU/L (ref 0–113)
WALNUT IGE QN: <0.1 KUA/I (ref 0–0.1)
WHITE ASH IGE QN: <0.1 KUA/I (ref 0–0.1)
WHITE ELM IGE QN: <0.1 KUA/I (ref 0–0.1)
WHITE MULBERRY IGE QN: <0.1 KUA/I (ref 0–0.1)
WHITE OAK IGE QN: <0.1 KUA/I (ref 0–0.1)

## 2024-12-16 DIAGNOSIS — Z79.899 LONG-TERM CURRENT USE OF BENZODIAZEPINE: ICD-10-CM

## 2024-12-16 RX ORDER — ALPRAZOLAM 0.5 MG
0.5 TABLET ORAL 3 TIMES DAILY PRN
Qty: 90 TABLET | Refills: 0 | Status: SHIPPED | OUTPATIENT
Start: 2024-12-16

## 2024-12-16 RX ORDER — ALPRAZOLAM 0.5 MG
TABLET ORAL
Qty: 90 TABLET | Refills: 0 | Status: SHIPPED | OUTPATIENT
Start: 2024-12-16

## 2024-12-19 ENCOUNTER — TELEPHONE (OUTPATIENT)
Dept: PULMONOLOGY | Facility: CLINIC | Age: 69
End: 2024-12-19

## 2024-12-19 DIAGNOSIS — J44.9 CHRONIC OBSTRUCTIVE PULMONARY DISEASE, UNSPECIFIED COPD TYPE (HCC): ICD-10-CM

## 2024-12-19 DIAGNOSIS — J45.909 UNCOMPLICATED ASTHMA, UNSPECIFIED ASTHMA SEVERITY, UNSPECIFIED WHETHER PERSISTENT: ICD-10-CM

## 2024-12-20 NOTE — TELEPHONE ENCOUNTER
Requested medication(s) are due for refill today: Yes  Patient has already received a courtesy refill: No  Other reason request has been forwarded to provider: please review

## 2024-12-21 RX ORDER — ALBUTEROL SULFATE 0.83 MG/ML
2.5 SOLUTION RESPIRATORY (INHALATION) EVERY 6 HOURS PRN
Qty: 720 ML | Refills: 3 | OUTPATIENT
Start: 2024-12-21

## 2024-12-27 NOTE — TELEPHONE ENCOUNTER
Patient's daughter is calling because they are still waiting for [facility] to fill the patient's medication and they are waiting on documentation from us please contact [facility] they need additional info the patient is almost out of meds please advise thank you please advise the patient 's daughter, Meaghan.      Albuterol inhaler and nebulizer solution  trelegy

## 2024-12-27 NOTE — TELEPHONE ENCOUNTER
Spoke to [facility] Prior Authorization dept. They opened up a new case for the Albuterol Nebulizer solution and it is now in review. We will hear something on 12/30/2024. The case id # is [id number]. The phone number is [phone] if need to follow up.

## 2025-01-13 DIAGNOSIS — Z79.899 LONG-TERM CURRENT USE OF BENZODIAZEPINE: ICD-10-CM

## 2025-01-14 RX ORDER — ALPRAZOLAM 0.5 MG
TABLET ORAL
Qty: 90 TABLET | Refills: 0 | Status: SHIPPED | OUTPATIENT
Start: 2025-01-14

## 2025-02-17 DIAGNOSIS — Z79.899 LONG-TERM CURRENT USE OF BENZODIAZEPINE: ICD-10-CM

## 2025-02-18 ENCOUNTER — TELEPHONE (OUTPATIENT)
Dept: GASTROENTEROLOGY | Facility: CLINIC | Age: 70
End: 2025-02-18

## 2025-02-18 DIAGNOSIS — K21.9 GASTROESOPHAGEAL REFLUX DISEASE WITHOUT ESOPHAGITIS: ICD-10-CM

## 2025-02-18 RX ORDER — PANTOPRAZOLE SODIUM 40 MG/1
40 TABLET, DELAYED RELEASE ORAL 2 TIMES DAILY
Qty: 180 TABLET | Refills: 3 | Status: SHIPPED | OUTPATIENT
Start: 2025-02-18

## 2025-02-18 RX ORDER — ALPRAZOLAM 0.5 MG
TABLET ORAL
Qty: 90 TABLET | Refills: 0 | Status: SHIPPED | OUTPATIENT
Start: 2025-02-18

## 2025-02-18 NOTE — TELEPHONE ENCOUNTER
Pantoprazole sodium dr tabs  40 mg  qty: 180  1 tab 2 times a day  Refills:3      [facility]  Rx # [id number]  Case item [id number]  Location [address]  Member no. [id number]

## 2025-02-20 DIAGNOSIS — I48.0 PAF (PAROXYSMAL ATRIAL FIBRILLATION) (HCC): ICD-10-CM

## 2025-02-20 RX ORDER — METOPROLOL SUCCINATE 50 MG/1
50 TABLET, EXTENDED RELEASE ORAL DAILY
Qty: 90 TABLET | Refills: 1 | Status: SHIPPED | OUTPATIENT
Start: 2025-02-20

## 2025-03-04 ENCOUNTER — OFFICE VISIT (OUTPATIENT)
Dept: UROLOGY | Facility: CLINIC | Age: 70
End: 2025-03-04
Payer: MEDICARE

## 2025-03-04 VITALS
TEMPERATURE: 97.6 F | BODY MASS INDEX: 41.52 KG/M2 | HEART RATE: 50 BPM | SYSTOLIC BLOOD PRESSURE: 130 MMHG | OXYGEN SATURATION: 97 % | WEIGHT: 274 LBS | HEIGHT: 68 IN | DIASTOLIC BLOOD PRESSURE: 90 MMHG

## 2025-03-04 DIAGNOSIS — N40.1 BENIGN LOCALIZED PROSTATIC HYPERPLASIA WITH LOWER URINARY TRACT SYMPTOMS (LUTS): ICD-10-CM

## 2025-03-04 DIAGNOSIS — R31.0 GROSS HEMATURIA: Primary | ICD-10-CM

## 2025-03-04 LAB — POST-VOID RESIDUAL VOLUME, ML POC: 31 ML

## 2025-03-04 PROCEDURE — 51798 US URINE CAPACITY MEASURE: CPT | Performed by: PHYSICIAN ASSISTANT

## 2025-03-04 PROCEDURE — 99213 OFFICE O/P EST LOW 20 MIN: CPT | Performed by: PHYSICIAN ASSISTANT

## 2025-03-04 NOTE — PROGRESS NOTES
Name: Simeon Giron      : 1955      MRN: [id number]  Encounter Provider: Maddi Hernandez PA-C  Encounter Date: 3/4/2025   Encounter department: [facility]OLOG[facility] [place]  :  Assessment & Plan  Gross hematuria  - U cyto 10/7/24: neg   - CT renal protocol 10/28/2024: No suspicious renal mass or upper tract urothelial lesions; multiple pulmonary nodules measuring up to 4 mm (no routine follow-up needed if patient considered low risk for lung cancer, if patient considered high risk for lung cancer, needs 12-month follow-up noncontrast chest CT)  - Cystoscopy (24) - 2 separate focal strictures about 18 Fr at bulbar urethra, both accommodated the scope. Prostate with BL lateral lobe hypertrophy, mod median lobe, no lesions; short prostate  - Denies any hematuria recurrence  - Obtain UA micro in 1 year    Orders:    POCT Measure PVR    Urinalysis with microscopic; Future    Benign localized prostatic hyperplasia with lower urinary tract symptoms (LUTS)  - Well managed on Flomax  - PVR today 31 mL   - if any worsening voiding in the future, can pursue surgical discussion with physician       Body mass index (BMI) of 40.0 to 44.9 in adult (HCC)         Follow up in 1 year for symptom reassessment with UA micro prior to visit.     History of Present Illness   Simeon Giron is a 70 y.o. male who presents for follow up evaluation of BPH and gross hematuria. He completed work-up for gross hematuria last year. He has been managed on Flomax for BPH. He denies any new or worsening urinary symptoms. Reports symptoms are well controlled on Flomax. Denies any recurrent episodes of gross hematuria.     Review of Systems   Constitutional:  Negative for chills and fever.   Respiratory:  Negative for shortness of breath.    Cardiovascular:  Negative for chest pain.   Gastrointestinal:  Negative for abdominal pain.   Genitourinary:  Negative for difficulty urinating, dysuria, flank pain, frequency, hematuria  and urgency.   Neurological:  Negative for dizziness.          Objective   There were no vitals taken for this visit.    Physical Exam  Constitutional:       Appearance: Normal appearance.   HENT:      Head: Normocephalic and atraumatic.      Right Ear: External ear normal.      Left Ear: External ear normal.      Nose: Nose normal.   Eyes:      General: No scleral icterus.     Conjunctiva/sclera: Conjunctivae normal.   Cardiovascular:      Pulses: Normal pulses.   Pulmonary:      Effort: Pulmonary effort is normal.   Musculoskeletal:         General: Normal range of motion.      Cervical back: Normal range of motion.   Neurological:      General: No focal deficit present.      Mental Status: He is alert and oriented to person, place, and time.   Psychiatric:         Mood and Affect: Mood normal.         Behavior: Behavior normal.         Thought Content: Thought content normal.         Judgment: Judgment normal.          Results   Lab Results   Component Value Date    PSA 0.23 02/08/2024     Lab Results   Component Value Date    CALCIUM 8.5 09/17/2024    K 4.2 09/17/2024    CO2 28 09/17/2024     09/17/2024    BUN 11 09/17/2024    CREATININE 0.70 09/17/2024     Lab Results   Component Value Date    WBC 7.29 12/12/2024    HGB 14.2 12/12/2024    HCT 41.3 12/12/2024    MCV 89 12/12/2024     12/12/2024       Office Urine Dip  No results found for this or any previous visit (from the past hour).

## 2025-03-04 NOTE — ASSESSMENT & PLAN NOTE
- U cyto 10/7/24: neg   - CT renal protocol 10/28/2024: No suspicious renal mass or upper tract urothelial lesions; multiple pulmonary nodules measuring up to 4 mm (no routine follow-up needed if patient considered low risk for lung cancer, if patient considered high risk for lung cancer, needs 12-month follow-up noncontrast chest CT)  - Cystoscopy (11/25/24) - 2 separate focal strictures about 18 Fr at bulbar urethra, both accommodated the scope. Prostate with BL lateral lobe hypertrophy, mod median lobe, no lesions; short prostate  - Denies any hematuria recurrence  - Obtain UA micro in 1 year    Orders:    POCT Measure PVR    Urinalysis with microscopic; Future

## 2025-03-04 NOTE — ASSESSMENT & PLAN NOTE
- Well managed on Flomax  - PVR today 31 mL   - if any worsening voiding in the future, can pursue surgical discussion with physician

## 2025-03-10 ENCOUNTER — TELEPHONE (OUTPATIENT)
Age: 70
End: 2025-03-10

## 2025-03-14 DIAGNOSIS — F32.A MILD DEPRESSION: ICD-10-CM

## 2025-03-14 DIAGNOSIS — K22.70 BARRETT'S ESOPHAGUS WITHOUT DYSPLASIA: ICD-10-CM

## 2025-03-14 DIAGNOSIS — F41.9 ANXIETY DISORDER, UNSPECIFIED TYPE: ICD-10-CM

## 2025-03-14 RX ORDER — SUCRALFATE 1 G/1
1 TABLET ORAL 4 TIMES DAILY
Qty: 360 TABLET | Refills: 1 | Status: SHIPPED | OUTPATIENT
Start: 2025-03-14 | End: 2026-09-05

## 2025-03-14 RX ORDER — SERTRALINE HYDROCHLORIDE 100 MG/1
100 TABLET, FILM COATED ORAL DAILY
Qty: 90 TABLET | Refills: 1 | Status: CANCELLED | OUTPATIENT
Start: 2025-03-14

## 2025-03-14 NOTE — TELEPHONE ENCOUNTER
Reason for call:   [x] Refill   [] Prior Auth  [] Other:     Office:   [x] PCP/Provider -   [] Specialty/Provider -     Medication: sucralfate (CARAFATE) 1 g     Dose/Frequency: Take 1 tablet (1 g total) by mouth 4 (four) times a day     Quantity: 360    Pharmacy: [facility] [address] [facility]   Does the patient have enough for 3 days?   [] Yes   [] No - Send as HP to POD    Mail Away Pharmacy   Does the patient have enough for 10 days?   [] Yes   [x] No - Send as HP to POD

## 2025-03-18 ENCOUNTER — RESULTS FOLLOW-UP (OUTPATIENT)
Dept: FAMILY MEDICINE CLINIC | Facility: CLINIC | Age: 70
End: 2025-03-18

## 2025-03-18 ENCOUNTER — HOSPITAL ENCOUNTER (OUTPATIENT)
Dept: MAMMOGRAPHY | Facility: CLINIC | Age: 70
Discharge: HOME | End: 2025-03-18
Payer: MEDICARE

## 2025-03-18 ENCOUNTER — HOSPITAL ENCOUNTER (OUTPATIENT)
Dept: ULTRASOUND IMAGING | Facility: CLINIC | Age: 70
Discharge: HOME | End: 2025-03-18
Payer: MEDICARE

## 2025-03-18 DIAGNOSIS — N64.4 PAIN OF RIGHT BREAST: ICD-10-CM

## 2025-03-18 PROCEDURE — 76642 ULTRASOUND BREAST LIMITED: CPT

## 2025-03-18 PROCEDURE — 77066 DX MAMMO INCL CAD BI: CPT

## 2025-03-18 PROCEDURE — G0279 TOMOSYNTHESIS, MAMMO: HCPCS

## 2025-03-31 ENCOUNTER — OFFICE VISIT (OUTPATIENT)
Dept: PULMONOLOGY | Facility: CLINIC | Age: 70
End: 2025-03-31
Payer: MEDICARE

## 2025-03-31 VITALS
SYSTOLIC BLOOD PRESSURE: 118 MMHG | DIASTOLIC BLOOD PRESSURE: 80 MMHG | TEMPERATURE: 98.6 F | HEART RATE: 52 BPM | WEIGHT: 273 LBS | BODY MASS INDEX: 41.51 KG/M2 | OXYGEN SATURATION: 98 %

## 2025-03-31 DIAGNOSIS — J44.9 CHRONIC OBSTRUCTIVE PULMONARY DISEASE, UNSPECIFIED COPD TYPE (HCC): Primary | ICD-10-CM

## 2025-03-31 DIAGNOSIS — G47.33 OSA (OBSTRUCTIVE SLEEP APNEA): ICD-10-CM

## 2025-03-31 DIAGNOSIS — Z79.899 LONG-TERM CURRENT USE OF BENZODIAZEPINE: ICD-10-CM

## 2025-03-31 PROCEDURE — 99214 OFFICE O/P EST MOD 30 MIN: CPT | Performed by: INTERNAL MEDICINE

## 2025-03-31 RX ORDER — TIOTROPIUM BROMIDE 18 UG/1
18 CAPSULE ORAL; RESPIRATORY (INHALATION) DAILY
Qty: 90 CAPSULE | Refills: 0 | Status: SHIPPED | OUTPATIENT
Start: 2025-03-31 | End: 2025-06-29

## 2025-04-01 RX ORDER — ALPRAZOLAM 0.5 MG
TABLET ORAL
Qty: 90 TABLET | Refills: 0 | Status: SHIPPED | OUTPATIENT
Start: 2025-04-01

## 2025-04-01 NOTE — PROGRESS NOTES
Progress note - Pulmonary Medicine   Simeon Giron 70 y.o. male MRN: [id number]       Impression & Plan:   70 y.o. M with PMHx of paroxysmal atrial fibrillation on Pradaxa, CHF, hypertension, hyperlipidemia, obesity, RED, GERD who comes in for follow up for CT chest.    1.  Abnormal CT chest - showed enlarged pulmonary artery which may be related to pulmonary hypertension      -  Echo still pending       -   Management of COPD and RED as below     2.  Moderate COPD - chronic bronchitis phenotype. 45 pack year, quit 36 years ago and also steam and chemical exposures as .. Significant productive cough.  [place] panel with few low grade allergens.   IgE not markedly elevated       -  Continue Breyna twice daily       -   Rx placed for Spiriva handihaler in hope that the insurance will cover it.         -   Continue Albuterol MDI provided.       3.   RED (AHI - unknown)  not currently on therapy.        -  I have reordered a home study to assess for RED severity.  Would consider switch to BIPAP in hope to help with ventilation as well.         -  I also discussed in depth the risk of leaving sleep apnea untreated including hypertension, heart failure, arrhythmia, MI and stroke.     4.  Weak phonation/vocal cord issues - this may be in part due to difficulty with exhalation given his underlying lung conditions.  He is following with ENT and had laryngoscopy.  Follow with speech therapy.       5.  L lung nodules - will need repeat CT in 1 year (11/2025).     6.   Sleep onset insomnia - he smokes marijuana.  We discussed using of edibles in place to see if they are helpful as well.      ______________________________________________________________________    HPI:    Simeon Giron presents today for follow-up for his COPD and his sleep.  He states that he still notes some shortness of breath but the biggest issue is productive cough which is persistent.   He did note some benefit from Trelegy but his  insurance would not pay for it.   He is still noting benefit from Breyna.  He is currently sleeping with his dogs and cat.   While he knows he has sleep apnea, he is hesitant to mess with his sleeping environment.  He is happy to have his dogs sleep next to him.          Review of Systems:  Review of Systems   Constitutional:  Positive for fatigue.   Eyes: Negative.    Respiratory:  Positive for apnea and shortness of breath.    Cardiovascular: Negative.    Gastrointestinal: Negative.    Genitourinary: Negative.    Musculoskeletal: Negative.    Skin: Negative.    Neurological: Negative.    Psychiatric/Behavioral:  Positive for decreased concentration and sleep disturbance.          Social history updates:  Social History     Tobacco Use   Smoking Status Former    Current packs/day: 0.00    Average packs/day: 2.5 packs/day for 18.0 years (45.0 ttl pk-yrs)    Types: Cigarettes    Start date:     Quit date:     Years since quittin.2    Passive exposure: Past   Smokeless Tobacco Never     Social History     Socioeconomic History    Marital status:      Spouse name: Not on file    Number of children: Not on file    Years of education: Not on file    Highest education level: Not on file   Occupational History    Not on file   Tobacco Use    Smoking status: Former     Current packs/day: 0.00     Average packs/day: 2.5 packs/day for 18.0 years (45.0 ttl pk-yrs)     Types: Cigarettes     Start date:      Quit date:      Years since quittin.2     Passive exposure: Past    Smokeless tobacco: Never   Vaping Use    Vaping status: Never Used   Substance and Sexual Activity    Alcohol use: Yes     Alcohol/week: 3.0 standard drinks of alcohol     Types: 3 Cans of beer per week     Comment: a week    Drug use: Yes     Types: Marijuana    Sexual activity: Not Currently   Other Topics Concern    Not on file   Social History Narrative    Not on file     Social Drivers of Health     Financial Resource  Strain: Low Risk  (2/8/2024)    Overall Financial Resource Strain (CARDIA)     Difficulty of Paying Living Expenses: Not very hard   Food Insecurity: No Food Insecurity (9/22/2020)    Received from [facility]    Hunger Vital Sign     Worried About Running Out of Food in the Last Year: Never true     Ran Out of Food in the Last Year: Never true   Transportation Needs: No Transportation Needs (2/8/2024)    PRAPARE - Transportation     Lack of Transportation (Medical): No     Lack of Transportation (Non-Medical): No   Physical Activity: Not on file   Stress: Not on file   Social Connections: Unknown (6/18/2024)    Received from [facility]    Social [place]     How often do you feel lonely or isolated from those around you? (Adult - for ages 18 years and over): Not on file   Intimate Partner Violence: Not on file   Housing Stability: Not on file       PhysicalExamination:  Vitals:   /80 (BP Location: Left arm, Patient Position: Sitting, Cuff Size: Standard)   Pulse (!) 52   Temp 98.6 °F (37 °C) (Temporal)   Wt 124 kg (273 lb)   SpO2 98%   BMI 41.51 kg/m²   General: Pleasant, Awake alert and oriented x 3, conversant without conversational dyspnea, NAD, normal affect  HEENT:  PERRL, Sclera noninjected, nonicteric OU, Nares patent,  no craniofacial abnormalities, Mucous membranes, moist, no oral lesions, normal dentition  NECK: Trachea midline, no accessory muscle use, no stridor, no cervical or supraclavicular adenopathy, JVP not elevated  CARDIAC: Reg, single s1/S2, no m/r/g  PULM: CTA bilaterally no wheezing, rhonchi or rales  ABD: Normoactive bowel sounds, soft nontender, nondistended, no rebound, no rigidity, no guarding  EXT: No cyanosis, no clubbing, no edema, normal capillary refill  NEURO: no focal neurologic deficits, AAOx3, moving all extremities appropriately    Diagnostic Data:  Labs:  I personally reviewed the most recent laboratory data pertinent to today's visit  not applicable    I  have personally reviewed pertinent lab results.  Lab Results   Component Value Date    WBC 7.29 12/12/2024    HGB 14.2 12/12/2024    HCT 41.3 12/12/2024    MCV 89 12/12/2024     12/12/2024     Lab Results   Component Value Date    CALCIUM 8.5 09/17/2024    K 4.2 09/17/2024    CO2 28 09/17/2024     09/17/2024    BUN 11 09/17/2024    CREATININE 0.70 09/17/2024     Lab Results   Component Value Date     12/12/2024     Lab Results   Component Value Date    ALT 10 09/17/2024    AST 16 09/17/2024    ALKPHOS 68 09/17/2024     PFT results:  The most recent pulmonary function tests were reviewed.  Spirometry:  FEV1/FVC Ratio is 69.  FEV1 is 66% predicted.  FVC is 72% predicted.    There is a significant postbronchodilator improvement in FEV1 and  FVC.     Flow volume loop:  Obstruction     Lung volumes:  Total lung capacity is 100% predicted.  Residual volume is 139% predicted.     Diffusing capacity:  79% predicted     IMPRESSION:  Moderate obstruction, significant improvement with bronchodilator  Lung volumes show air trapping  Mild decrease in gas transfer        Imaging:  I personally reviewed the images on the PAC system pertinent to today's visit  CT chest   MPRESSION:  5 mm and smaller predominantly peripheral right greater than left lung nodules. If the chest CT from 2018 from [facility] can be obtained for direct comparison, stability can be established. In the absence of comparison   studies, follow-up with a chest CT with no contrast in 1 year is optional per 2017 Fleischner Society guidelines.  Pulmonary artery enlargement which can be a normal variant or can be seen with pulmonary hypertension.        Other studies:  Echo - pending    Chidi Alcaraz DO

## 2025-04-16 ENCOUNTER — RA CDI HCC (OUTPATIENT)
Dept: OTHER | Facility: HOSPITAL | Age: 70
End: 2025-04-16

## 2025-04-22 ENCOUNTER — TELEPHONE (OUTPATIENT)
Age: 70
End: 2025-04-22

## 2025-04-22 DIAGNOSIS — J44.9 CHRONIC OBSTRUCTIVE PULMONARY DISEASE, UNSPECIFIED COPD TYPE (HCC): Primary | ICD-10-CM

## 2025-04-22 NOTE — TELEPHONE ENCOUNTER
Patient and daughter calling.  The inhalers the doctor prescribed will need prior auth.  The insurance company will pay for Trelegy is the doctor approves that inhaler. The daughter stated they would prefer the patient to take one inhaler rather than two if at all possible.      Patient's daughter requesting a call back as soon as possible    (Phone) [phone]

## 2025-04-23 RX ORDER — FLUTICASONE FUROATE, UMECLIDINIUM BROMIDE AND VILANTEROL TRIFENATATE 200; 62.5; 25 UG/1; UG/1; UG/1
1 POWDER RESPIRATORY (INHALATION) DAILY
Qty: 60 BLISTER | Refills: 5 | Status: SHIPPED | OUTPATIENT
Start: 2025-04-23 | End: 2025-05-23

## 2025-04-23 NOTE — TELEPHONE ENCOUNTER
Spoke with pt and made him aware that Trelegy was ordered and sent to [facility] pharmacy on file.

## 2025-05-06 ENCOUNTER — TELEPHONE (OUTPATIENT)
Dept: FAMILY MEDICINE CLINIC | Facility: CLINIC | Age: 70
End: 2025-05-06

## 2025-05-06 DIAGNOSIS — I48.0 PAF (PAROXYSMAL ATRIAL FIBRILLATION) (HCC): Primary | ICD-10-CM

## 2025-05-06 DIAGNOSIS — R10.9 LEFT FLANK PAIN: ICD-10-CM

## 2025-05-06 DIAGNOSIS — E78.2 MIXED HYPERLIPIDEMIA: ICD-10-CM

## 2025-05-06 DIAGNOSIS — E29.1 MALE HYPOGONADISM: ICD-10-CM

## 2025-05-06 DIAGNOSIS — Z12.5 ENCOUNTER FOR SCREENING PROSTATE SPECIFIC ANTIGEN (PSA) MEASUREMENT: ICD-10-CM

## 2025-05-06 NOTE — TELEPHONE ENCOUNTER
Nickdavid has an appointment Thursday, daughter would like to take him for labs tomorrow morning - Full blood work including thyroid and testosterone.  Can you please order this.

## 2025-05-07 ENCOUNTER — APPOINTMENT (OUTPATIENT)
Dept: LAB | Facility: CLINIC | Age: 70
End: 2025-05-07
Payer: MEDICARE

## 2025-05-07 DIAGNOSIS — E78.2 MIXED HYPERLIPIDEMIA: ICD-10-CM

## 2025-05-07 DIAGNOSIS — E29.1 MALE HYPOGONADISM: ICD-10-CM

## 2025-05-07 DIAGNOSIS — Z12.5 ENCOUNTER FOR SCREENING PROSTATE SPECIFIC ANTIGEN (PSA) MEASUREMENT: ICD-10-CM

## 2025-05-07 DIAGNOSIS — I48.0 PAF (PAROXYSMAL ATRIAL FIBRILLATION) (HCC): ICD-10-CM

## 2025-05-07 LAB
ALBUMIN SERPL BCG-MCNC: 4 G/DL (ref 3.5–5)
ALP SERPL-CCNC: 77 U/L (ref 34–104)
ALT SERPL W P-5'-P-CCNC: 11 U/L (ref 7–52)
ANION GAP SERPL CALCULATED.3IONS-SCNC: 9 MMOL/L (ref 4–13)
AST SERPL W P-5'-P-CCNC: 16 U/L (ref 13–39)
BASOPHILS # BLD AUTO: 0.09 THOUSANDS/ÂΜL (ref 0–0.1)
BASOPHILS NFR BLD AUTO: 1 % (ref 0–1)
BILIRUB SERPL-MCNC: 0.51 MG/DL (ref 0.2–1)
BUN SERPL-MCNC: 13 MG/DL (ref 5–25)
CALCIUM SERPL-MCNC: 9.1 MG/DL (ref 8.4–10.2)
CHLORIDE SERPL-SCNC: 105 MMOL/L (ref 96–108)
CHOLEST SERPL-MCNC: 152 MG/DL (ref ?–200)
CO2 SERPL-SCNC: 29 MMOL/L (ref 21–32)
CREAT SERPL-MCNC: 0.77 MG/DL (ref 0.6–1.3)
EOSINOPHIL # BLD AUTO: 0.4 THOUSAND/ÂΜL (ref 0–0.61)
EOSINOPHIL NFR BLD AUTO: 6 % (ref 0–6)
ERYTHROCYTE [DISTWIDTH] IN BLOOD BY AUTOMATED COUNT: 13 % (ref 11.6–15.1)
GFR SERPL CREATININE-BSD FRML MDRD: 91 ML/MIN/1.73SQ M
GLUCOSE P FAST SERPL-MCNC: 81 MG/DL (ref 65–99)
HCT VFR BLD AUTO: 39.4 % (ref 36.5–49.3)
HDLC SERPL-MCNC: 53 MG/DL
HGB BLD-MCNC: 13.4 G/DL (ref 12–17)
IMM GRANULOCYTES # BLD AUTO: 0.02 THOUSAND/UL (ref 0–0.2)
IMM GRANULOCYTES NFR BLD AUTO: 0 % (ref 0–2)
LDLC SERPL CALC-MCNC: 77 MG/DL (ref 0–100)
LYMPHOCYTES # BLD AUTO: 1.79 THOUSANDS/ÂΜL (ref 0.6–4.47)
LYMPHOCYTES NFR BLD AUTO: 25 % (ref 14–44)
MCH RBC QN AUTO: 31 PG (ref 26.8–34.3)
MCHC RBC AUTO-ENTMCNC: 34 G/DL (ref 31.4–37.4)
MCV RBC AUTO: 91 FL (ref 82–98)
MONOCYTES # BLD AUTO: 0.54 THOUSAND/ÂΜL (ref 0.17–1.22)
MONOCYTES NFR BLD AUTO: 8 % (ref 4–12)
NEUTROPHILS # BLD AUTO: 4.29 THOUSANDS/ÂΜL (ref 1.85–7.62)
NEUTS SEG NFR BLD AUTO: 60 % (ref 43–75)
NRBC BLD AUTO-RTO: 0 /100 WBCS
PLATELET # BLD AUTO: 223 THOUSANDS/UL (ref 149–390)
PMV BLD AUTO: 9.2 FL (ref 8.9–12.7)
POTASSIUM SERPL-SCNC: 4.4 MMOL/L (ref 3.5–5.3)
PROT SERPL-MCNC: 6.8 G/DL (ref 6.4–8.4)
PSA SERPL-MCNC: 0.3 NG/ML (ref 0–4)
RBC # BLD AUTO: 4.32 MILLION/UL (ref 3.88–5.62)
SODIUM SERPL-SCNC: 143 MMOL/L (ref 135–147)
TRIGL SERPL-MCNC: 108 MG/DL (ref ?–150)
TSH SERPL DL<=0.05 MIU/L-ACNC: 1.42 UIU/ML (ref 0.45–4.5)
WBC # BLD AUTO: 7.13 THOUSAND/UL (ref 4.31–10.16)

## 2025-05-07 PROCEDURE — 80053 COMPREHEN METABOLIC PANEL: CPT

## 2025-05-07 PROCEDURE — 84443 ASSAY THYROID STIM HORMONE: CPT

## 2025-05-07 PROCEDURE — 36415 COLL VENOUS BLD VENIPUNCTURE: CPT

## 2025-05-07 PROCEDURE — 85025 COMPLETE CBC W/AUTO DIFF WBC: CPT

## 2025-05-07 PROCEDURE — G0103 PSA SCREENING: HCPCS

## 2025-05-07 PROCEDURE — 80061 LIPID PANEL: CPT

## 2025-05-08 ENCOUNTER — OFFICE VISIT (OUTPATIENT)
Dept: FAMILY MEDICINE CLINIC | Facility: CLINIC | Age: 70
End: 2025-05-08
Payer: MEDICARE

## 2025-05-08 ENCOUNTER — APPOINTMENT (OUTPATIENT)
Dept: LAB | Facility: CLINIC | Age: 70
End: 2025-05-08
Payer: MEDICARE

## 2025-05-08 ENCOUNTER — RESULTS FOLLOW-UP (OUTPATIENT)
Dept: FAMILY MEDICINE CLINIC | Facility: CLINIC | Age: 70
End: 2025-05-08

## 2025-05-08 VITALS
SYSTOLIC BLOOD PRESSURE: 116 MMHG | OXYGEN SATURATION: 99 % | HEIGHT: 68 IN | BODY MASS INDEX: 41.13 KG/M2 | TEMPERATURE: 96.5 F | WEIGHT: 271.4 LBS | DIASTOLIC BLOOD PRESSURE: 68 MMHG | HEART RATE: 50 BPM

## 2025-05-08 DIAGNOSIS — I48.0 PAF (PAROXYSMAL ATRIAL FIBRILLATION) (HCC): ICD-10-CM

## 2025-05-08 DIAGNOSIS — F32.A MILD DEPRESSION: ICD-10-CM

## 2025-05-08 DIAGNOSIS — Z00.00 MEDICARE ANNUAL WELLNESS VISIT, SUBSEQUENT: ICD-10-CM

## 2025-05-08 DIAGNOSIS — I42.0 DILATED IDIOPATHIC CARDIOMYOPATHY (HCC): Primary | ICD-10-CM

## 2025-05-08 DIAGNOSIS — F41.9 ANXIETY DISORDER, UNSPECIFIED TYPE: ICD-10-CM

## 2025-05-08 DIAGNOSIS — R00.1 BRADYCARDIA: ICD-10-CM

## 2025-05-08 PROCEDURE — 84403 ASSAY OF TOTAL TESTOSTERONE: CPT

## 2025-05-08 PROCEDURE — 36415 COLL VENOUS BLD VENIPUNCTURE: CPT

## 2025-05-08 PROCEDURE — G2211 COMPLEX E/M VISIT ADD ON: HCPCS | Performed by: FAMILY MEDICINE

## 2025-05-08 PROCEDURE — 93000 ELECTROCARDIOGRAM COMPLETE: CPT | Performed by: FAMILY MEDICINE

## 2025-05-08 PROCEDURE — 99214 OFFICE O/P EST MOD 30 MIN: CPT | Performed by: FAMILY MEDICINE

## 2025-05-08 PROCEDURE — G0439 PPPS, SUBSEQ VISIT: HCPCS | Performed by: FAMILY MEDICINE

## 2025-05-08 PROCEDURE — 84402 ASSAY OF FREE TESTOSTERONE: CPT

## 2025-05-08 RX ORDER — SERTRALINE HYDROCHLORIDE 25 MG/1
25 TABLET, FILM COATED ORAL DAILY
Qty: 90 TABLET | Refills: 3 | Status: SHIPPED | OUTPATIENT
Start: 2025-05-08 | End: 2026-05-03

## 2025-05-08 RX ORDER — SERTRALINE HYDROCHLORIDE 25 MG/1
25 TABLET, FILM COATED ORAL DAILY
Qty: 90 TABLET | Refills: 3 | Status: SHIPPED | OUTPATIENT
Start: 2025-05-08 | End: 2025-05-08 | Stop reason: SDUPTHER

## 2025-05-08 RX ORDER — METOPROLOL SUCCINATE 25 MG/1
25 TABLET, EXTENDED RELEASE ORAL DAILY
Qty: 90 TABLET | Refills: 0 | Status: SHIPPED | OUTPATIENT
Start: 2025-05-08

## 2025-05-08 NOTE — PROGRESS NOTES
Name: Simeon Giron      : 1955      MRN: [id number]  Encounter Provider: Liliane Chapa MD  Encounter Date: 2025   Encounter department: [facility]  :  Assessment & Plan  Dilated idiopathic cardiomyopathy (HCC)  On Metoprolol  Advised follow-up with cardiology         PAF (paroxysmal atrial fibrillation) (HCC)  On Metoprolol and Pradaxa. Will reduce Metoprolol dose to 25 mg due to bradycardia and he has been feeling sluggish. Advised follow-up with cardiology  Orders:  •  metoprolol succinate (TOPROL-XL) 25 mg 24 hr tablet; Take 1 tablet (25 mg total) by mouth daily    Mild depression  Increase Sertraline to 125 mg     Orders:  •  sertraline (ZOLOFT) 25 mg tablet; Take 1 tablet (25 mg total) by mouth daily    Anxiety disorder, unspecified type  Increase Zoloft to 125 mg  Taking Xanax at night.  Orders:  •  sertraline (ZOLOFT) 25 mg tablet; Take 1 tablet (25 mg total) by mouth daily    Bradycardia  On Metoprolol 50 mg  Reduce dose to 25 mg   Advised follow-up with cardiology        Medicare annual wellness visit, subsequent           Depression Screening and Follow-up Plan: Patient was screened for depression during today's encounter. They screened negative with a PHQ-9 score of 1.      Falls Plan of Care: balance, strength, and gait training instructions were provided.       Preventive health issues were discussed with patient, and age appropriate screening tests were ordered as noted in patient's After Visit Summary. Personalized health advice and appropriate referrals for health education or preventive services given if needed, as noted in patient's After Visit Summary.    History of Present Illness     Patient is here for a check up -  had labs done which we reviewed in detail.     Daughter says he has no drive to get up and do things. He feels sluggish.  He has a lot of stress related to his dog who has diabetes. He also lost his wife in Dec 2023, and is still grieving.  Daughter is going to take him to a grief group.  Has times he feels very anxious, gets shaky and sweaty.  He is on Zoloft 100 mg and this helped initially now not helping as much. Takes Xanax to help him sleep  He tends to isolate - has been like this his whole life.            Patient Care Team:  Liliane Chapa MD as PCP - General (Family Medicine)  Joann Jimenez PA-C (Gastroenterology)    Review of Systems   Constitutional:  Negative for activity change, appetite change, fatigue and unexpected weight change.        No energy   Respiratory:  Positive for chest tightness. Negative for shortness of breath.    Cardiovascular:  Negative for chest pain and leg swelling.   Gastrointestinal:  Negative for abdominal pain.   Neurological:  Negative for headaches.   Psychiatric/Behavioral:  Positive for dysphoric mood. Negative for suicidal ideas. The patient is nervous/anxious.      Medical History Reviewed by provider this encounter:  Tobacco  Allergies  Meds  Problems  Med Hx  Surg Hx  Fam Hx       Annual Wellness Visit Questionnaire   Simeon is here for his Subsequent Wellness visit. Last Medicare Wellness visit information reviewed, patient interviewed and updates made to the record today.      Health Risk Assessment:   Patient rates overall health as good. Patient feels that their physical health rating is slightly better. Patient is satisfied with their life. Eyesight was rated as same. Hearing was rated as same. Patient feels that their emotional and mental health rating is same. Patients states they are never, rarely angry. Patient states they are sometimes unusually tired/fatigued. Pain experienced in the last 7 days has been none. Patient states that he has experienced no weight loss or gain in last 6 months.     Depression Screening:   PHQ-9 Score: 1      Fall Risk Screening:   In the past year, patient has experienced: history of falling in past year    Number of falls: 2 or more  Injured during  fall?: No    Feels unsteady when standing or walking?: Yes    Worried about falling?: No      Home Safety:  Patient does not have trouble with stairs inside or outside of their home. Patient has working smoke alarms and has working carbon monoxide detector. Home safety hazards include: none.     Nutrition:   Current diet is Regular.     Medications:   Patient is not currently taking any over-the-counter supplements. Patient is able to manage medications.     Activities of Daily Living (ADLs)/Instrumental Activities of Daily Living (IADLs):   Walk and transfer into and out of bed and chair?: Yes  Dress and groom yourself?: Yes    Bathe or shower yourself?: Yes    Feed yourself? Yes  Do your laundry/housekeeping?: Yes  Manage your money, pay your bills and track your expenses?: Yes  Make your own meals?: Yes    Do your own shopping?: Yes    Previous Hospitalizations:   Any hospitalizations or ED visits within the last 12 months?: No      Advance Care Planning:   Living will: Yes    Durable POA for healthcare: Yes    Advanced directive: Yes      Cognitive Screening:   Provider or family/friend/caregiver concerned regarding cognition?: No    Preventive Screenings      Cardiovascular Screening:    General: Screening Not Indicated, History Lipid Disorder, Risks and Benefits Discussed and Screening Current    Due for: Lipid Panel      Diabetes Screening:     General: Screening Current and Risks and Benefits Discussed    Due for: Blood Glucose      Colorectal Cancer Screening:     General: Screening Current    Due for: Colonoscopy - Low Risk      Prostate Cancer Screening:    General: Screening Current and Risks and Benefits Discussed    Due for: PSA      Osteoporosis Screening:    General: Screening Not Indicated      Abdominal Aortic Aneurysm (AAA) Screening:    Risk factors include: age between 65-74 yo and tobacco use        General: Risks and Benefits Discussed and Screening Current      Lung Cancer Screening:      "General: Screening Not Indicated      Hepatitis C Screening:    General: Screening Current    Immunizations:  - Immunizations due: Zoster (Shingrix)    Screening, Brief Intervention, and Referral to Treatment (SBIRT)     Screening  Typical number of drinks in a day: 1  Typical number of drinks in a week: 5  Interpretation: Low risk drinking behavior.    Single Item Drug Screening:  How often have you used an illegal drug (including marijuana) or a prescription medication for non-medical reasons in the past year? never    Single Item Drug Screen Score: 0  Interpretation: Negative screen for possible drug use disorder    Social Drivers of Health     Financial Resource Strain: Low Risk  (2/8/2024)    Overall Financial Resource Strain (CARDIA)    • Difficulty of Paying Living Expenses: Not very hard   Food Insecurity: No Food Insecurity (5/8/2025)    Hunger Vital Sign    • Worried About Running Out of Food in the Last Year: Never true    • Ran Out of Food in the Last Year: Never true   Transportation Needs: No Transportation Needs (5/8/2025)    PRAPARE - Transportation    • Lack of Transportation (Medical): No    • Lack of Transportation (Non-Medical): No   Housing Stability: Low Risk  (5/8/2025)    Housing Stability Vital Sign    • Unable to Pay for Housing in the Last Year: No    • Number of Times Moved in the Last Year: 0    • Homeless in the Last Year: No   Utilities: Not At Risk (5/8/2025)    [facility] Utilities    • Threatened with loss of utilities: No     No results found.    Objective   /68   Pulse (!) 50   Temp (!) 96.5 °F (35.8 °C)   Ht 5' 8\" (1.727 m)   Wt 123 kg (271 lb 6.4 oz)   SpO2 99%   BMI 41.27 kg/m²     Physical Exam  Vitals and nursing note reviewed.   Constitutional:       General: He is not in acute distress.     Appearance: He is well-developed. He is obese. He is not diaphoretic.   HENT:      Head: Normocephalic and atraumatic.      Right Ear: External ear normal.      Left Ear: External " ear normal.   Eyes:      Conjunctiva/sclera: Conjunctivae normal.   Cardiovascular:      Rate and Rhythm: Regular rhythm. Bradycardia present.      Heart sounds: Normal heart sounds. No murmur heard.     No friction rub. No gallop.   Pulmonary:      Effort: Pulmonary effort is normal. No respiratory distress.      Breath sounds: Normal breath sounds. No stridor. No wheezing or rales.   Musculoskeletal:      Comments: Trace edema at the ankles   Neurological:      General: No focal deficit present.      Mental Status: He is alert.   Psychiatric:         Mood and Affect: Mood normal.     EKG: sinus bradycardia. VR 43

## 2025-05-08 NOTE — ASSESSMENT & PLAN NOTE
On Metoprolol and Pradaxa. Will reduce Metoprolol dose to 25 mg due to bradycardia and he has been feeling sluggish. Advised follow-up with cardiology  Orders:  •  metoprolol succinate (TOPROL-XL) 25 mg 24 hr tablet; Take 1 tablet (25 mg total) by mouth daily

## 2025-05-08 NOTE — ASSESSMENT & PLAN NOTE
Increase Zoloft to 125 mg  Taking Xanax at night.  Orders:  •  sertraline (ZOLOFT) 25 mg tablet; Take 1 tablet (25 mg total) by mouth daily

## 2025-05-08 NOTE — ASSESSMENT & PLAN NOTE
Increase Sertraline to 125 mg     Orders:  •  sertraline (ZOLOFT) 25 mg tablet; Take 1 tablet (25 mg total) by mouth daily

## 2025-05-08 NOTE — PATIENT INSTRUCTIONS
Medicare Preventive Visit Patient Instructions  Thank you for completing your Welcome to Medicare Visit or Medicare Annual Wellness Visit today. Your next wellness visit will be due in one year (5/9/2026).  The screening/preventive services that you may require over the next 5-10 years are detailed below. Some tests may not apply to you based off risk factors and/or age. Screening tests ordered at today's visit but not completed yet may show as past due. Also, please note that scanned in results may not display below.  Preventive Screenings:  Service Recommendations Previous Testing/Comments   Colorectal Cancer Screening  Colonoscopy    Fecal Occult Blood Test (FOBT)/Fecal Immunochemical Test (FIT)  Fecal DNA/Cologuard Test  Flexible Sigmoidoscopy Age: 45-75 years old   Colonoscopy: every 10 years (May be performed more frequently if at higher risk)  OR  FOBT/FIT: every 1 year  OR  Cologuard: every 3 years  OR  Sigmoidoscopy: every 5 years  Screening may be recommended earlier than age 45 if at higher risk for colorectal cancer. Also, an individualized decision between you and your healthcare provider will decide whether screening between the ages of 76-85 would be appropriate. Colonoscopy: 05/02/2024  FOBT/FIT: Not on file  Cologuard: Not on file  Sigmoidoscopy: Not on file    Screening Current  Due for Colonoscopy - Low Risk     Prostate Cancer Screening Individualized decision between patient and health care provider in men between ages of 55-69   Medicare will cover every 12 months beginning on the day after your 50th birthday PSA: 0.303 ng/mL     Screening Current  Due for PSA     Hepatitis C Screening Once for adults born between 1945 and 1965  More frequently in patients at high risk for Hepatitis C Hep C Antibody: 02/08/2024    Screening Current   Diabetes Screening 1-2 times per year if you're at risk for diabetes or have pre-diabetes Fasting glucose: 81 mg/dL (5/7/2025)  A1C: 5.5 % (2/8/2024)  Screening  Current  Due for Blood Glucose   Cholesterol Screening Once every 5 years if you don't have a lipid disorder. May order more often based on risk factors. Lipid panel: 05/07/2025  Screening Not Indicated  History Lipid Disorder  Due for Lipid Panel      Other Preventive Screenings Covered by Medicare:  Abdominal Aortic Aneurysm (AAA) Screening: covered once if your at risk. You're considered to be at risk if you have a family history of AAA or a male between the age of 65-75 who smoking at least 100 cigarettes in your lifetime.  Lung Cancer Screening: covers low dose CT scan once per year if you meet all of the following conditions: (1) Age 55-77; (2) No signs or symptoms of lung cancer; (3) Current smoker or have quit smoking within the last 15 years; (4) You have a tobacco smoking history of at least 20 pack years (packs per day x number of years you smoked); (5) You get a written order from a healthcare provider.  Glaucoma Screening: covered annually if you're considered high risk: (1) You have diabetes OR (2) Family history of glaucoma OR (3)  aged 50 and older OR (4)  American aged 65 and older  Osteoporosis Screening: covered every 2 years if you meet one of the following conditions: (1) Have a vertebral abnormality; (2) On glucocorticoid therapy for more than 3 months; (3) Have primary hyperparathyroidism; (4) On osteoporosis medications and need to assess response to drug therapy.  HIV Screening: covered annually if you're between the age of 15-65. Also covered annually if you are younger than 15 and older than 65 with risk factors for HIV infection. For pregnant patients, it is covered up to 3 times per pregnancy.    Immunizations:  Immunization Recommendations   Influenza Vaccine Annual influenza vaccination during flu season is recommended for all persons aged >= 6 months who do not have contraindications   Pneumococcal Vaccine   * Pneumococcal conjugate vaccine = PCV13 (Prevnar  13), PCV15 (Vaxneuvance), PCV20 (Prevnar 20)  * Pneumococcal polysaccharide vaccine = PPSV23 (Pneumovax) Adults 19-63 yo with certain risk factors or if 65+ yo  If never received any pneumonia vaccine: recommend Prevnar 20 (PCV20)  Give PCV20 if previously received 1 dose of PCV13 or PPSV23   Hepatitis B Vaccine 3 dose series if at intermediate or high risk (ex: diabetes, end stage renal disease, liver disease)   Respiratory syncytial virus (RSV) Vaccine - COVERED BY MEDICARE PART D  * RSVPreF3 (Arexvy) CDC recommends that adults 60 years of age and older may receive a single dose of RSV vaccine using shared clinical decision-making (SCDM)   Tetanus (Td) Vaccine - COST NOT COVERED BY MEDICARE PART B Following completion of primary series, a booster dose should be given every 10 years to maintain immunity against tetanus. Td may also be given as tetanus wound prophylaxis.   Tdap Vaccine - COST NOT COVERED BY MEDICARE PART B Recommended at least once for all adults. For pregnant patients, recommended with each pregnancy.   Shingles Vaccine (Shingrix) - COST NOT COVERED BY MEDICARE PART B  2 shot series recommended in those 19 years and older who have or will have weakened immune systems or those 50 years and older     Health Maintenance Due:      Topic Date Due   • Colorectal Cancer Screening  05/01/2029   • Hepatitis C Screening  Completed     Immunizations Due:      Topic Date Due   • COVID-19 Vaccine (5 - 2024-25 season) 09/01/2024     Advance Directives   What are advance directives?  Advance directives are legal documents that state your wishes and plans for medical care. These plans are made ahead of time in case you lose your ability to make decisions for yourself. Advance directives can apply to any medical decision, such as the treatments you want, and if you want to donate organs.   What are the types of advance directives?  There are many types of advance directives, and each state has rules about how to  use them. You may choose a combination of any of the following:  Living will:  This is a written record of the treatment you want. You can also choose which treatments you do not want, which to limit, and which to stop at a certain time. This includes surgery, medicine, IV fluid, and tube feedings.   Durable power of  for healthcare (DPAHC):  This is a written record that states who you want to make healthcare choices for you when you are unable to make them for yourself. This person, called a proxy, is usually a family member or a friend. You may choose more than 1 proxy.  Do not resuscitate (DNR) order:  A DNR order is used in case your heart stops beating or you stop breathing. It is a request not to have certain forms of treatment, such as CPR. A DNR order may be included in other types of advance directives.  Medical directive:  This covers the care that you want if you are in a coma, near death, or unable to make decisions for yourself. You can list the treatments you want for each condition. Treatment may include pain medicine, surgery, blood transfusions, dialysis, IV or tube feedings, and a ventilator (breathing machine).  Values history:  This document has questions about your views, beliefs, and how you feel and think about life. This information can help others choose the care that you would choose.  Why are advance directives important?  An advance directive helps you control your care. Although spoken wishes may be used, it is better to have your wishes written down. Spoken wishes can be misunderstood, or not followed. Treatments may be given even if you do not want them. An advance directive may make it easier for your family to make difficult choices about your care.   Fall Prevention    Fall prevention  includes ways to make your home and other areas safer. It also includes ways you can move more carefully to prevent a fall. Health conditions that cause changes in your blood pressure,  vision, or muscle strength and coordination may increase your risk for falls. Medicines may also increase your risk for falls if they make you dizzy, weak, or sleepy.   Fall prevention tips:   Stand or sit up slowly.    Use assistive devices as directed.    Wear shoes that fit well and have soles that .    Wear a personal alarm.    Stay active.    Manage your medical conditions.    Home Safety Tips:  Add items to prevent falls in the bathroom.    Keep paths clear.    Install bright lights in your home.    Keep items you use often on shelves within reach.    Paint or place reflective tape on the edges of your stairs.    Weight Management   Why it is important to manage your weight:  Being overweight increases your risk of health conditions such as heart disease, high blood pressure, type 2 diabetes, and certain types of cancer. It can also increase your risk for osteoarthritis, sleep apnea, and other respiratory problems. Aim for a slow, steady weight loss. Even a small amount of weight loss can lower your risk of health problems.  How to lose weight safely:  A safe and healthy way to lose weight is to eat fewer calories and get regular exercise. You can lose up about 1 pound a week by decreasing the number of calories you eat by 500 calories each day.   Healthy meal plan for weight management:  A healthy meal plan includes a variety of foods, contains fewer calories, and helps you stay healthy. A healthy meal plan includes the following:  Eat whole-grain foods more often.  A healthy meal plan should contain fiber. Fiber is the part of grains, fruits, and vegetables that is not broken down by your body. Whole-grain foods are healthy and provide extra fiber in your diet. Some examples of whole-grain foods are whole-wheat breads and pastas, oatmeal, brown rice, and bulgur.  Eat a variety of vegetables every day.  Include dark, leafy greens such as spinach, kale, waqar greens, and mustard greens. Eat yellow and  orange vegetables such as carrots, sweet potatoes, and winter squash.   Eat a variety of fruits every day.  Choose fresh or canned fruit (canned in its own juice or light syrup) instead of juice. Fruit juice has very little or no fiber.  Eat low-fat dairy foods.  Drink fat-free (skim) milk or 1% milk. Eat fat-free yogurt and low-fat cottage cheese. Try low-fat cheeses such as mozzarella and other reduced-fat cheeses.  Choose meat and other protein foods that are low in fat.  Choose beans or other legumes such as split peas or lentils. Choose fish, skinless poultry (chicken or turkey), or lean cuts of red meat (beef or pork). Before you cook meat or poultry, cut off any visible fat.   Use less fat and oil.  Try baking foods instead of frying them. Add less fat, such as margarine, sour cream, regular salad dressing and mayonnaise to foods. Eat fewer high-fat foods. Some examples of high-fat foods include french fries, doughnuts, ice cream, and cakes.  Eat fewer sweets.  Limit foods and drinks that are high in sugar. This includes candy, cookies, regular soda, and sweetened drinks.  Exercise:  Exercise at least 30 minutes per day on most days of the week. Some examples of exercise include walking, biking, dancing, and swimming. You can also fit in more physical activity by taking the stairs instead of the elevator or parking farther away from stores. Ask your healthcare provider about the best exercise plan for you.      © Copyright [employer] 2018 Information is for End User's use only and may not be sold, redistributed or otherwise used for commercial purposes. All illustrations and images included in CareNotes® are the copyrighted property of [employer] or [employer]

## 2025-05-10 LAB
TESTOST FREE SERPL-MCNC: 0.8 PG/ML (ref 6.6–18.1)
TESTOST SERPL-MCNC: 318 NG/DL (ref 264–916)

## 2025-05-14 DIAGNOSIS — Z79.899 LONG-TERM CURRENT USE OF BENZODIAZEPINE: ICD-10-CM

## 2025-05-15 RX ORDER — ALPRAZOLAM 0.5 MG
TABLET ORAL
Qty: 90 TABLET | Refills: 0 | Status: SHIPPED | OUTPATIENT
Start: 2025-05-15

## 2025-06-06 DIAGNOSIS — G62.9 PERIPHERAL POLYNEUROPATHY: ICD-10-CM

## 2025-06-07 RX ORDER — GABAPENTIN 300 MG/1
300 CAPSULE ORAL 3 TIMES DAILY
Qty: 270 CAPSULE | Refills: 1 | Status: SHIPPED | OUTPATIENT
Start: 2025-06-07

## 2025-06-10 DIAGNOSIS — Z79.899 LONG-TERM CURRENT USE OF BENZODIAZEPINE: ICD-10-CM

## 2025-06-10 RX ORDER — ALPRAZOLAM 0.5 MG
0.5 TABLET ORAL 3 TIMES DAILY PRN
Qty: 90 TABLET | Refills: 0 | Status: SHIPPED | OUTPATIENT
Start: 2025-06-10

## 2025-06-10 RX ORDER — ALPRAZOLAM 0.5 MG
TABLET ORAL
Qty: 90 TABLET | Refills: 0 | OUTPATIENT
Start: 2025-06-10

## 2025-06-10 NOTE — TELEPHONE ENCOUNTER
Reason for call:   [ ] Refill   [ ] Prior Auth  [ ] Other:     Office:   [ ] PCP/Provider - Ordering and Authorizing Provider:  Liliane Chapa MD- [facility] [facility]         Medication: ALPRAZolam (XANAX) 0.5 mg tablet     Dose/Frequency:  Take 1 tablet (0.5 mg total) by mouth 3 (three) times a day as needed for anxiety    Quantity: 90    Pharmacy: [facility] - [address]          Mail Away Pharmacy   Does the patient have enough for 10 days?   [ ] Yes   [ ] No - Send as HP to POD

## 2025-06-26 ENCOUNTER — TELEPHONE (OUTPATIENT)
Age: 70
End: 2025-06-26

## 2025-06-26 NOTE — TELEPHONE ENCOUNTER
Patient called to reschedule his 1 month follow up appointment with PCP for today at 12 noon due to personal issues.  Patient is rescheduled for Thursday, 7/31/25 at 2:30 pm.

## 2025-07-30 ENCOUNTER — TELEPHONE (OUTPATIENT)
Dept: ADMINISTRATIVE | Facility: OTHER | Age: 70
End: 2025-07-30

## 2025-07-31 ENCOUNTER — OFFICE VISIT (OUTPATIENT)
Dept: FAMILY MEDICINE CLINIC | Facility: CLINIC | Age: 70
End: 2025-07-31
Payer: MEDICARE

## 2025-07-31 VITALS
HEART RATE: 55 BPM | DIASTOLIC BLOOD PRESSURE: 74 MMHG | WEIGHT: 274 LBS | BODY MASS INDEX: 41.52 KG/M2 | TEMPERATURE: 97.2 F | SYSTOLIC BLOOD PRESSURE: 120 MMHG | HEIGHT: 68 IN | OXYGEN SATURATION: 97 %

## 2025-07-31 DIAGNOSIS — K21.9 GASTROESOPHAGEAL REFLUX DISEASE WITHOUT ESOPHAGITIS: Primary | ICD-10-CM

## 2025-07-31 DIAGNOSIS — E78.2 MIXED HYPERLIPIDEMIA: ICD-10-CM

## 2025-07-31 DIAGNOSIS — R00.1 BRADYCARDIA: ICD-10-CM

## 2025-07-31 PROBLEM — M54.50 ACUTE LEFT-SIDED LOW BACK PAIN WITHOUT SCIATICA: Status: RESOLVED | Noted: 2024-10-07 | Resolved: 2025-07-31

## 2025-07-31 PROBLEM — L60.8 TOENAIL DEFORMITY: Status: RESOLVED | Noted: 2024-05-23 | Resolved: 2025-07-31

## 2025-07-31 PROBLEM — R31.0 GROSS HEMATURIA: Status: RESOLVED | Noted: 2024-10-07 | Resolved: 2025-07-31

## 2025-07-31 PROCEDURE — G2211 COMPLEX E/M VISIT ADD ON: HCPCS | Performed by: FAMILY MEDICINE

## 2025-07-31 PROCEDURE — 99214 OFFICE O/P EST MOD 30 MIN: CPT | Performed by: FAMILY MEDICINE

## 2025-07-31 RX ORDER — ATORVASTATIN CALCIUM 10 MG/1
10 TABLET, FILM COATED ORAL DAILY
Qty: 90 TABLET | Refills: 3 | Status: SHIPPED | OUTPATIENT
Start: 2025-07-31